# Patient Record
Sex: FEMALE | Race: WHITE | Employment: UNEMPLOYED | URBAN - METROPOLITAN AREA
[De-identification: names, ages, dates, MRNs, and addresses within clinical notes are randomized per-mention and may not be internally consistent; named-entity substitution may affect disease eponyms.]

---

## 2017-03-21 ENCOUNTER — OFFICE VISIT (OUTPATIENT)
Dept: FAMILY MEDICINE CLINIC | Age: 77
End: 2017-03-21

## 2017-03-21 VITALS
WEIGHT: 168 LBS | SYSTOLIC BLOOD PRESSURE: 110 MMHG | HEIGHT: 63 IN | DIASTOLIC BLOOD PRESSURE: 68 MMHG | BODY MASS INDEX: 29.77 KG/M2

## 2017-03-21 DIAGNOSIS — I10 ESSENTIAL HYPERTENSION: Primary | ICD-10-CM

## 2017-03-21 LAB
A/G RATIO: 1.7 (ref 1.1–2.2)
ALBUMIN SERPL-MCNC: 4.4 G/DL (ref 3.4–5)
ALP BLD-CCNC: 77 U/L (ref 40–129)
ALT SERPL-CCNC: 14 U/L (ref 10–40)
ANION GAP SERPL CALCULATED.3IONS-SCNC: 15 MMOL/L (ref 3–16)
AST SERPL-CCNC: 22 U/L (ref 15–37)
BILIRUB SERPL-MCNC: 0.5 MG/DL (ref 0–1)
BUN BLDV-MCNC: 18 MG/DL (ref 7–20)
CALCIUM SERPL-MCNC: 9.6 MG/DL (ref 8.3–10.6)
CHLORIDE BLD-SCNC: 102 MMOL/L (ref 99–110)
CHOLESTEROL, TOTAL: 232 MG/DL (ref 0–199)
CO2: 27 MMOL/L (ref 21–32)
CREAT SERPL-MCNC: 0.9 MG/DL (ref 0.6–1.2)
GFR AFRICAN AMERICAN: >60
GFR NON-AFRICAN AMERICAN: >60
GLOBULIN: 2.6 G/DL
GLUCOSE BLD-MCNC: 87 MG/DL (ref 70–99)
HDLC SERPL-MCNC: 92 MG/DL (ref 40–60)
LDL CHOLESTEROL CALCULATED: 119 MG/DL
POTASSIUM SERPL-SCNC: 4.1 MMOL/L (ref 3.5–5.1)
SODIUM BLD-SCNC: 144 MMOL/L (ref 136–145)
TOTAL PROTEIN: 7 G/DL (ref 6.4–8.2)
TRIGL SERPL-MCNC: 105 MG/DL (ref 0–150)
VLDLC SERPL CALC-MCNC: 21 MG/DL

## 2017-03-21 PROCEDURE — 99213 OFFICE O/P EST LOW 20 MIN: CPT | Performed by: FAMILY MEDICINE

## 2017-03-21 PROCEDURE — 1090F PRES/ABSN URINE INCON ASSESS: CPT | Performed by: FAMILY MEDICINE

## 2017-03-21 PROCEDURE — G8399 PT W/DXA RESULTS DOCUMENT: HCPCS | Performed by: FAMILY MEDICINE

## 2017-03-21 PROCEDURE — 36415 COLL VENOUS BLD VENIPUNCTURE: CPT | Performed by: FAMILY MEDICINE

## 2017-03-21 PROCEDURE — G8484 FLU IMMUNIZE NO ADMIN: HCPCS | Performed by: FAMILY MEDICINE

## 2017-03-21 PROCEDURE — 1036F TOBACCO NON-USER: CPT | Performed by: FAMILY MEDICINE

## 2017-03-21 PROCEDURE — 3014F SCREEN MAMMO DOC REV: CPT | Performed by: FAMILY MEDICINE

## 2017-03-21 PROCEDURE — 1123F ACP DISCUSS/DSCN MKR DOCD: CPT | Performed by: FAMILY MEDICINE

## 2017-03-21 PROCEDURE — 4040F PNEUMOC VAC/ADMIN/RCVD: CPT | Performed by: FAMILY MEDICINE

## 2017-03-21 PROCEDURE — G8420 CALC BMI NORM PARAMETERS: HCPCS | Performed by: FAMILY MEDICINE

## 2017-03-21 PROCEDURE — G8427 DOCREV CUR MEDS BY ELIG CLIN: HCPCS | Performed by: FAMILY MEDICINE

## 2017-03-21 PROCEDURE — 3017F COLORECTAL CA SCREEN DOC REV: CPT | Performed by: FAMILY MEDICINE

## 2017-06-12 DIAGNOSIS — I10 ESSENTIAL HYPERTENSION: ICD-10-CM

## 2017-06-12 RX ORDER — TRIAMTERENE AND HYDROCHLOROTHIAZIDE 37.5; 25 MG/1; MG/1
TABLET ORAL
Qty: 90 TABLET | Refills: 0 | Status: SHIPPED | OUTPATIENT
Start: 2017-06-12 | End: 2017-09-20 | Stop reason: SDUPTHER

## 2017-08-16 DIAGNOSIS — Z96.642 HISTORY OF TOTAL LEFT HIP REPLACEMENT: Primary | ICD-10-CM

## 2017-08-16 PROCEDURE — 73502 X-RAY EXAM HIP UNI 2-3 VIEWS: CPT | Performed by: ORTHOPAEDIC SURGERY

## 2017-09-20 ENCOUNTER — OFFICE VISIT (OUTPATIENT)
Dept: FAMILY MEDICINE CLINIC | Age: 77
End: 2017-09-20

## 2017-09-20 VITALS
HEIGHT: 63 IN | BODY MASS INDEX: 30.12 KG/M2 | DIASTOLIC BLOOD PRESSURE: 80 MMHG | WEIGHT: 170 LBS | SYSTOLIC BLOOD PRESSURE: 120 MMHG

## 2017-09-20 DIAGNOSIS — R53.83 FATIGUE, UNSPECIFIED TYPE: ICD-10-CM

## 2017-09-20 DIAGNOSIS — I10 ESSENTIAL HYPERTENSION: Primary | ICD-10-CM

## 2017-09-20 PROCEDURE — G8427 DOCREV CUR MEDS BY ELIG CLIN: HCPCS | Performed by: FAMILY MEDICINE

## 2017-09-20 PROCEDURE — 36415 COLL VENOUS BLD VENIPUNCTURE: CPT | Performed by: FAMILY MEDICINE

## 2017-09-20 PROCEDURE — 3017F COLORECTAL CA SCREEN DOC REV: CPT | Performed by: FAMILY MEDICINE

## 2017-09-20 PROCEDURE — 99214 OFFICE O/P EST MOD 30 MIN: CPT | Performed by: FAMILY MEDICINE

## 2017-09-20 PROCEDURE — 4040F PNEUMOC VAC/ADMIN/RCVD: CPT | Performed by: FAMILY MEDICINE

## 2017-09-20 PROCEDURE — G8399 PT W/DXA RESULTS DOCUMENT: HCPCS | Performed by: FAMILY MEDICINE

## 2017-09-20 PROCEDURE — 1036F TOBACCO NON-USER: CPT | Performed by: FAMILY MEDICINE

## 2017-09-20 PROCEDURE — 1090F PRES/ABSN URINE INCON ASSESS: CPT | Performed by: FAMILY MEDICINE

## 2017-09-20 PROCEDURE — G8417 CALC BMI ABV UP PARAM F/U: HCPCS | Performed by: FAMILY MEDICINE

## 2017-09-20 PROCEDURE — 1123F ACP DISCUSS/DSCN MKR DOCD: CPT | Performed by: FAMILY MEDICINE

## 2017-09-20 RX ORDER — TRIAMTERENE AND HYDROCHLOROTHIAZIDE 37.5; 25 MG/1; MG/1
TABLET ORAL
Qty: 90 TABLET | Refills: 1 | Status: SHIPPED | OUTPATIENT
Start: 2017-09-20 | End: 2018-04-03 | Stop reason: SDUPTHER

## 2017-09-20 ASSESSMENT — PATIENT HEALTH QUESTIONNAIRE - PHQ9
2. FEELING DOWN, DEPRESSED OR HOPELESS: 0
SUM OF ALL RESPONSES TO PHQ9 QUESTIONS 1 & 2: 0
SUM OF ALL RESPONSES TO PHQ QUESTIONS 1-9: 0
1. LITTLE INTEREST OR PLEASURE IN DOING THINGS: 0

## 2017-09-21 LAB
A/G RATIO: 1.5 (ref 1.1–2.2)
ALBUMIN SERPL-MCNC: 4.4 G/DL (ref 3.4–5)
ALP BLD-CCNC: 74 U/L (ref 40–129)
ALT SERPL-CCNC: 18 U/L (ref 10–40)
ANION GAP SERPL CALCULATED.3IONS-SCNC: 15 MMOL/L (ref 3–16)
AST SERPL-CCNC: 27 U/L (ref 15–37)
BASOPHILS ABSOLUTE: 0 K/UL (ref 0–0.2)
BASOPHILS RELATIVE PERCENT: 0.6 %
BILIRUB SERPL-MCNC: 0.4 MG/DL (ref 0–1)
BUN BLDV-MCNC: 22 MG/DL (ref 7–20)
CALCIUM SERPL-MCNC: 9.9 MG/DL (ref 8.3–10.6)
CHLORIDE BLD-SCNC: 104 MMOL/L (ref 99–110)
CO2: 26 MMOL/L (ref 21–32)
CREAT SERPL-MCNC: 1.2 MG/DL (ref 0.6–1.2)
EOSINOPHILS ABSOLUTE: 0.1 K/UL (ref 0–0.6)
EOSINOPHILS RELATIVE PERCENT: 1.8 %
GFR AFRICAN AMERICAN: 53
GFR NON-AFRICAN AMERICAN: 44
GLOBULIN: 2.9 G/DL
GLUCOSE BLD-MCNC: 89 MG/DL (ref 70–99)
HCT VFR BLD CALC: 43 % (ref 36–48)
HEMOGLOBIN: 14.4 G/DL (ref 12–16)
LYMPHOCYTES ABSOLUTE: 1.8 K/UL (ref 1–5.1)
LYMPHOCYTES RELATIVE PERCENT: 28.8 %
MCH RBC QN AUTO: 30.5 PG (ref 26–34)
MCHC RBC AUTO-ENTMCNC: 33.4 G/DL (ref 31–36)
MCV RBC AUTO: 91.3 FL (ref 80–100)
MONOCYTES ABSOLUTE: 0.8 K/UL (ref 0–1.3)
MONOCYTES RELATIVE PERCENT: 12.4 %
NEUTROPHILS ABSOLUTE: 3.6 K/UL (ref 1.7–7.7)
NEUTROPHILS RELATIVE PERCENT: 56.4 %
PDW BLD-RTO: 13.3 % (ref 12.4–15.4)
PLATELET # BLD: 144 K/UL (ref 135–450)
PMV BLD AUTO: 9.2 FL (ref 5–10.5)
POTASSIUM SERPL-SCNC: 4 MMOL/L (ref 3.5–5.1)
RBC # BLD: 4.71 M/UL (ref 4–5.2)
SODIUM BLD-SCNC: 145 MMOL/L (ref 136–145)
TOTAL PROTEIN: 7.3 G/DL (ref 6.4–8.2)
TSH SERPL DL<=0.05 MIU/L-ACNC: 2.99 UIU/ML (ref 0.27–4.2)
WBC # BLD: 6.3 K/UL (ref 4–11)

## 2018-04-03 ENCOUNTER — OFFICE VISIT (OUTPATIENT)
Dept: FAMILY MEDICINE CLINIC | Age: 78
End: 2018-04-03

## 2018-04-03 VITALS
OXYGEN SATURATION: 96 % | WEIGHT: 171.2 LBS | BODY MASS INDEX: 30.33 KG/M2 | HEIGHT: 63 IN | SYSTOLIC BLOOD PRESSURE: 110 MMHG | DIASTOLIC BLOOD PRESSURE: 70 MMHG | HEART RATE: 78 BPM

## 2018-04-03 DIAGNOSIS — K29.70 GASTRITIS, PRESENCE OF BLEEDING UNSPECIFIED, UNSPECIFIED CHRONICITY, UNSPECIFIED GASTRITIS TYPE: ICD-10-CM

## 2018-04-03 DIAGNOSIS — I10 ESSENTIAL HYPERTENSION: Primary | ICD-10-CM

## 2018-04-03 DIAGNOSIS — R25.1 TREMOR: ICD-10-CM

## 2018-04-03 LAB
A/G RATIO: 1.6 (ref 1.1–2.2)
ALBUMIN SERPL-MCNC: 4.4 G/DL (ref 3.4–5)
ALP BLD-CCNC: 72 U/L (ref 40–129)
ALT SERPL-CCNC: 16 U/L (ref 10–40)
ANION GAP SERPL CALCULATED.3IONS-SCNC: 16 MMOL/L (ref 3–16)
AST SERPL-CCNC: 24 U/L (ref 15–37)
BASOPHILS ABSOLUTE: 0 K/UL (ref 0–0.2)
BASOPHILS RELATIVE PERCENT: 0.8 %
BILIRUB SERPL-MCNC: 0.5 MG/DL (ref 0–1)
BUN BLDV-MCNC: 24 MG/DL (ref 7–20)
CALCIUM SERPL-MCNC: 9.5 MG/DL (ref 8.3–10.6)
CHLORIDE BLD-SCNC: 102 MMOL/L (ref 99–110)
CHOLESTEROL, TOTAL: 231 MG/DL (ref 0–199)
CO2: 27 MMOL/L (ref 21–32)
CREAT SERPL-MCNC: 1 MG/DL (ref 0.6–1.2)
EOSINOPHILS ABSOLUTE: 0.2 K/UL (ref 0–0.6)
EOSINOPHILS RELATIVE PERCENT: 4.2 %
FOLATE: >20 NG/ML (ref 4.78–24.2)
GFR AFRICAN AMERICAN: >60
GFR NON-AFRICAN AMERICAN: 54
GLOBULIN: 2.7 G/DL
GLUCOSE BLD-MCNC: 86 MG/DL (ref 70–99)
HCT VFR BLD CALC: 40.9 % (ref 36–48)
HDLC SERPL-MCNC: 93 MG/DL (ref 40–60)
HEMOGLOBIN: 14.2 G/DL (ref 12–16)
LDL CHOLESTEROL CALCULATED: 116 MG/DL
LYMPHOCYTES ABSOLUTE: 1.5 K/UL (ref 1–5.1)
LYMPHOCYTES RELATIVE PERCENT: 32.6 %
MCH RBC QN AUTO: 31 PG (ref 26–34)
MCHC RBC AUTO-ENTMCNC: 34.7 G/DL (ref 31–36)
MCV RBC AUTO: 89.3 FL (ref 80–100)
MONOCYTES ABSOLUTE: 0.5 K/UL (ref 0–1.3)
MONOCYTES RELATIVE PERCENT: 10.1 %
NEUTROPHILS ABSOLUTE: 2.4 K/UL (ref 1.7–7.7)
NEUTROPHILS RELATIVE PERCENT: 52.3 %
PDW BLD-RTO: 13.1 % (ref 12.4–15.4)
PLATELET # BLD: 162 K/UL (ref 135–450)
PMV BLD AUTO: 9.2 FL (ref 5–10.5)
POTASSIUM SERPL-SCNC: 4.4 MMOL/L (ref 3.5–5.1)
RBC # BLD: 4.58 M/UL (ref 4–5.2)
SODIUM BLD-SCNC: 145 MMOL/L (ref 136–145)
TOTAL PROTEIN: 7.1 G/DL (ref 6.4–8.2)
TRIGL SERPL-MCNC: 108 MG/DL (ref 0–150)
VITAMIN B-12: 378 PG/ML (ref 211–911)
VLDLC SERPL CALC-MCNC: 22 MG/DL
WBC # BLD: 4.6 K/UL (ref 4–11)

## 2018-04-03 PROCEDURE — G8417 CALC BMI ABV UP PARAM F/U: HCPCS | Performed by: FAMILY MEDICINE

## 2018-04-03 PROCEDURE — 1036F TOBACCO NON-USER: CPT | Performed by: FAMILY MEDICINE

## 2018-04-03 PROCEDURE — 36415 COLL VENOUS BLD VENIPUNCTURE: CPT | Performed by: FAMILY MEDICINE

## 2018-04-03 PROCEDURE — 1090F PRES/ABSN URINE INCON ASSESS: CPT | Performed by: FAMILY MEDICINE

## 2018-04-03 PROCEDURE — 3017F COLORECTAL CA SCREEN DOC REV: CPT | Performed by: FAMILY MEDICINE

## 2018-04-03 PROCEDURE — 1123F ACP DISCUSS/DSCN MKR DOCD: CPT | Performed by: FAMILY MEDICINE

## 2018-04-03 PROCEDURE — 4040F PNEUMOC VAC/ADMIN/RCVD: CPT | Performed by: FAMILY MEDICINE

## 2018-04-03 PROCEDURE — G8427 DOCREV CUR MEDS BY ELIG CLIN: HCPCS | Performed by: FAMILY MEDICINE

## 2018-04-03 PROCEDURE — 99214 OFFICE O/P EST MOD 30 MIN: CPT | Performed by: FAMILY MEDICINE

## 2018-04-03 PROCEDURE — G8399 PT W/DXA RESULTS DOCUMENT: HCPCS | Performed by: FAMILY MEDICINE

## 2018-04-03 RX ORDER — TRIAMTERENE AND HYDROCHLOROTHIAZIDE 37.5; 25 MG/1; MG/1
TABLET ORAL
Qty: 90 TABLET | Refills: 1 | Status: SHIPPED | OUTPATIENT
Start: 2018-04-03 | End: 2018-10-04 | Stop reason: SDUPTHER

## 2018-08-21 DIAGNOSIS — M25.552 LEFT HIP PAIN: Primary | ICD-10-CM

## 2018-09-24 ENCOUNTER — TELEPHONE (OUTPATIENT)
Dept: FAMILY MEDICINE CLINIC | Age: 78
End: 2018-09-24

## 2018-10-04 DIAGNOSIS — I10 ESSENTIAL HYPERTENSION: ICD-10-CM

## 2018-10-08 RX ORDER — TRIAMTERENE AND HYDROCHLOROTHIAZIDE 37.5; 25 MG/1; MG/1
TABLET ORAL
Qty: 90 TABLET | Refills: 0 | Status: SHIPPED | OUTPATIENT
Start: 2018-10-08 | End: 2018-11-08 | Stop reason: SDUPTHER

## 2018-10-10 ENCOUNTER — OFFICE VISIT (OUTPATIENT)
Dept: FAMILY MEDICINE CLINIC | Age: 78
End: 2018-10-10
Payer: MEDICARE

## 2018-10-10 VITALS
TEMPERATURE: 97.7 F | DIASTOLIC BLOOD PRESSURE: 80 MMHG | HEART RATE: 88 BPM | SYSTOLIC BLOOD PRESSURE: 120 MMHG | OXYGEN SATURATION: 95 % | HEIGHT: 64 IN | BODY MASS INDEX: 29.85 KG/M2

## 2018-10-10 DIAGNOSIS — S39.012A BACK STRAIN, INITIAL ENCOUNTER: ICD-10-CM

## 2018-10-10 DIAGNOSIS — I10 ESSENTIAL HYPERTENSION: Primary | ICD-10-CM

## 2018-10-10 DIAGNOSIS — K21.9 GASTROESOPHAGEAL REFLUX DISEASE WITHOUT ESOPHAGITIS: ICD-10-CM

## 2018-10-10 PROCEDURE — 1036F TOBACCO NON-USER: CPT | Performed by: PHYSICIAN ASSISTANT

## 2018-10-10 PROCEDURE — 1101F PT FALLS ASSESS-DOCD LE1/YR: CPT | Performed by: PHYSICIAN ASSISTANT

## 2018-10-10 PROCEDURE — G8399 PT W/DXA RESULTS DOCUMENT: HCPCS | Performed by: PHYSICIAN ASSISTANT

## 2018-10-10 PROCEDURE — 4040F PNEUMOC VAC/ADMIN/RCVD: CPT | Performed by: PHYSICIAN ASSISTANT

## 2018-10-10 PROCEDURE — 1090F PRES/ABSN URINE INCON ASSESS: CPT | Performed by: PHYSICIAN ASSISTANT

## 2018-10-10 PROCEDURE — G8427 DOCREV CUR MEDS BY ELIG CLIN: HCPCS | Performed by: PHYSICIAN ASSISTANT

## 2018-10-10 PROCEDURE — G8417 CALC BMI ABV UP PARAM F/U: HCPCS | Performed by: PHYSICIAN ASSISTANT

## 2018-10-10 PROCEDURE — 99214 OFFICE O/P EST MOD 30 MIN: CPT | Performed by: PHYSICIAN ASSISTANT

## 2018-10-10 PROCEDURE — G8484 FLU IMMUNIZE NO ADMIN: HCPCS | Performed by: PHYSICIAN ASSISTANT

## 2018-10-10 PROCEDURE — 1123F ACP DISCUSS/DSCN MKR DOCD: CPT | Performed by: PHYSICIAN ASSISTANT

## 2018-10-10 RX ORDER — LISINOPRIL 20 MG/1
20 TABLET ORAL DAILY
Qty: 30 TABLET | Refills: 3 | Status: SHIPPED | OUTPATIENT
Start: 2018-10-10 | End: 2018-10-19

## 2018-10-10 NOTE — PROGRESS NOTES
Brissa Wilcox 29 COMPLAINT  Chief Complaint   Patient presents with    Hypertension    Other     mid back pain x 2 months on and off no injury    Jaw Pain     aches on and off x 2 months       HISTORY OF PRESENT  ILLNESS  Hernesto Dillon is a 70 y.o.  female   Here for 6 mo follow up:    HTN: stable. Highest 135-125/ 92-80 mmHG. The triamterene continued to cause stomach burning despite with and without food and taking Prilosec as well. Wants to try another med. Has a good diet and exercises. Does not drink cola. Rarely eats fried foods. Also c/o thoracic back pains for a few months. Denies bowel or bladder changes or incontinence. Denies extremity paresthesias or weakness. Assoc with left shoulder joint pain. Able to move all joints. Is worse with stress. Exerercises regularly. Two months ago c/o left jaw pains. Clenches teeth and has a mouth piece but not using it lately. ROS    Remaining are reviewed and otherwise negative for other constitutional, neurologic, EENT, cardiac, pulmonary, GI, , musculoskeletal or extremity complaints. PAST MEDICAL/SURGICAL, SOCIAL, &  FAMILY HISTORY:  Reviewed and updated accordingly. ALLERGIES :   Shellfish allergy    MEDICATIONS:  Current Outpatient Prescriptions   Medication Sig Dispense Refill    lisinopril (PRINIVIL;ZESTRIL) 20 MG tablet Take 1 tablet by mouth daily 30 tablet 3    triamterene-hydrochlorothiazide (MAXZIDE-25) 37.5-25 MG per tablet TAKE ONE TABLET BY MOUTH DAILY 90 tablet 0     No current facility-administered medications for this visit. PHYSICAL EXAM     Vitals:    10/10/18 1317   BP: 120/80   Site: Right Upper Arm   Position: Sitting   Cuff Size: Medium Adult   Pulse: 88   Temp: 97.7 °F (36.5 °C)   TempSrc: Temporal   SpO2: 95%   Height: 5' 3.5\" (1.613 m)     APPEARANCE: Pleasant, friendly, well-nourished. No acute distress. HEENT: Head: Normocephalic, Jaw clicking. Eyes: EOMI,PERRLA.

## 2018-10-21 ENCOUNTER — TELEPHONE (OUTPATIENT)
Dept: OTHER | Age: 78
End: 2018-10-21

## 2018-11-08 ENCOUNTER — OFFICE VISIT (OUTPATIENT)
Dept: FAMILY MEDICINE CLINIC | Age: 78
End: 2018-11-08
Payer: MEDICARE

## 2018-11-08 VITALS
HEIGHT: 64 IN | SYSTOLIC BLOOD PRESSURE: 128 MMHG | OXYGEN SATURATION: 98 % | BODY MASS INDEX: 29.71 KG/M2 | DIASTOLIC BLOOD PRESSURE: 70 MMHG | HEART RATE: 70 BPM | WEIGHT: 174 LBS | RESPIRATION RATE: 18 BRPM

## 2018-11-08 DIAGNOSIS — I10 ESSENTIAL HYPERTENSION: ICD-10-CM

## 2018-11-08 PROCEDURE — 4040F PNEUMOC VAC/ADMIN/RCVD: CPT | Performed by: PHYSICIAN ASSISTANT

## 2018-11-08 PROCEDURE — 1036F TOBACCO NON-USER: CPT | Performed by: PHYSICIAN ASSISTANT

## 2018-11-08 PROCEDURE — G8427 DOCREV CUR MEDS BY ELIG CLIN: HCPCS | Performed by: PHYSICIAN ASSISTANT

## 2018-11-08 PROCEDURE — G8399 PT W/DXA RESULTS DOCUMENT: HCPCS | Performed by: PHYSICIAN ASSISTANT

## 2018-11-08 PROCEDURE — 1090F PRES/ABSN URINE INCON ASSESS: CPT | Performed by: PHYSICIAN ASSISTANT

## 2018-11-08 PROCEDURE — G8417 CALC BMI ABV UP PARAM F/U: HCPCS | Performed by: PHYSICIAN ASSISTANT

## 2018-11-08 PROCEDURE — 99213 OFFICE O/P EST LOW 20 MIN: CPT | Performed by: PHYSICIAN ASSISTANT

## 2018-11-08 PROCEDURE — 1101F PT FALLS ASSESS-DOCD LE1/YR: CPT | Performed by: PHYSICIAN ASSISTANT

## 2018-11-08 PROCEDURE — 1123F ACP DISCUSS/DSCN MKR DOCD: CPT | Performed by: PHYSICIAN ASSISTANT

## 2018-11-08 PROCEDURE — G8484 FLU IMMUNIZE NO ADMIN: HCPCS | Performed by: PHYSICIAN ASSISTANT

## 2018-11-08 PROCEDURE — G8510 SCR DEP NEG, NO PLAN REQD: HCPCS | Performed by: PHYSICIAN ASSISTANT

## 2018-11-08 RX ORDER — TRIAMTERENE AND HYDROCHLOROTHIAZIDE 37.5; 25 MG/1; MG/1
TABLET ORAL
Qty: 90 TABLET | Refills: 2 | Status: SHIPPED | OUTPATIENT
Start: 2018-11-08 | End: 2019-04-17 | Stop reason: SDUPTHER

## 2018-11-08 ASSESSMENT — PATIENT HEALTH QUESTIONNAIRE - PHQ9
SUM OF ALL RESPONSES TO PHQ QUESTIONS 1-9: 0
1. LITTLE INTEREST OR PLEASURE IN DOING THINGS: 0
SUM OF ALL RESPONSES TO PHQ9 QUESTIONS 1 & 2: 0
SUM OF ALL RESPONSES TO PHQ QUESTIONS 1-9: 0
2. FEELING DOWN, DEPRESSED OR HOPELESS: 0

## 2018-11-08 NOTE — PROGRESS NOTES
sensory exam.  SKIN: Warm, dry, normal turgor. Cap refill <3secs. No rashes, petechiae, purpura. ADDITIONAL STUDIES     Pertinent laboratory results and/or imaging reviewed. No orders of the defined types were placed in this encounter. IMPRESSION/ PLAN      1. Essential hypertension    -Stable back on HCTZ-triamterene.   -Labwork UTD  -Continue good diet and exercise. 2. HEALTH MTNCE:  Does not wish to have fluvax.        Orders Placed This Encounter   Medications    triamterene-hydrochlorothiazide (MAXZIDE-25) 37.5-25 MG per tablet     Sig: TAKE ONE TABLET BY MOUTH DAILY     Dispense:  90 tablet     Refill:  2

## 2019-04-17 ENCOUNTER — OFFICE VISIT (OUTPATIENT)
Dept: FAMILY MEDICINE CLINIC | Age: 79
End: 2019-04-17
Payer: MEDICARE

## 2019-04-17 VITALS
WEIGHT: 173.8 LBS | HEIGHT: 63 IN | SYSTOLIC BLOOD PRESSURE: 120 MMHG | BODY MASS INDEX: 30.79 KG/M2 | DIASTOLIC BLOOD PRESSURE: 70 MMHG | OXYGEN SATURATION: 97 % | HEART RATE: 85 BPM

## 2019-04-17 DIAGNOSIS — G89.29 CHRONIC PAIN OF RIGHT KNEE: ICD-10-CM

## 2019-04-17 DIAGNOSIS — I10 ESSENTIAL HYPERTENSION: Primary | ICD-10-CM

## 2019-04-17 DIAGNOSIS — M25.561 CHRONIC PAIN OF RIGHT KNEE: ICD-10-CM

## 2019-04-17 DIAGNOSIS — K21.9 GASTROESOPHAGEAL REFLUX DISEASE WITHOUT ESOPHAGITIS: ICD-10-CM

## 2019-04-17 LAB
A/G RATIO: 1.5 (ref 1.1–2.2)
ALBUMIN SERPL-MCNC: 4.5 G/DL (ref 3.4–5)
ALP BLD-CCNC: 80 U/L (ref 40–129)
ALT SERPL-CCNC: 18 U/L (ref 10–40)
ANION GAP SERPL CALCULATED.3IONS-SCNC: 14 MMOL/L (ref 3–16)
AST SERPL-CCNC: 24 U/L (ref 15–37)
BILIRUB SERPL-MCNC: 0.4 MG/DL (ref 0–1)
BUN BLDV-MCNC: 23 MG/DL (ref 7–20)
CALCIUM SERPL-MCNC: 9.8 MG/DL (ref 8.3–10.6)
CHLORIDE BLD-SCNC: 105 MMOL/L (ref 99–110)
CHOLESTEROL, TOTAL: 235 MG/DL (ref 0–199)
CO2: 27 MMOL/L (ref 21–32)
CREAT SERPL-MCNC: 1 MG/DL (ref 0.6–1.2)
GFR AFRICAN AMERICAN: >60
GFR NON-AFRICAN AMERICAN: 54
GLOBULIN: 3 G/DL
GLUCOSE BLD-MCNC: 96 MG/DL (ref 70–99)
HDLC SERPL-MCNC: 102 MG/DL (ref 40–60)
LDL CHOLESTEROL CALCULATED: 118 MG/DL
POTASSIUM SERPL-SCNC: 4.2 MMOL/L (ref 3.5–5.1)
SODIUM BLD-SCNC: 146 MMOL/L (ref 136–145)
TOTAL PROTEIN: 7.5 G/DL (ref 6.4–8.2)
TRIGL SERPL-MCNC: 73 MG/DL (ref 0–150)
VLDLC SERPL CALC-MCNC: 15 MG/DL

## 2019-04-17 PROCEDURE — 99214 OFFICE O/P EST MOD 30 MIN: CPT | Performed by: FAMILY MEDICINE

## 2019-04-17 PROCEDURE — 1123F ACP DISCUSS/DSCN MKR DOCD: CPT | Performed by: FAMILY MEDICINE

## 2019-04-17 PROCEDURE — 36415 COLL VENOUS BLD VENIPUNCTURE: CPT | Performed by: FAMILY MEDICINE

## 2019-04-17 PROCEDURE — G8427 DOCREV CUR MEDS BY ELIG CLIN: HCPCS | Performed by: FAMILY MEDICINE

## 2019-04-17 PROCEDURE — 4040F PNEUMOC VAC/ADMIN/RCVD: CPT | Performed by: FAMILY MEDICINE

## 2019-04-17 PROCEDURE — G8417 CALC BMI ABV UP PARAM F/U: HCPCS | Performed by: FAMILY MEDICINE

## 2019-04-17 PROCEDURE — 1090F PRES/ABSN URINE INCON ASSESS: CPT | Performed by: FAMILY MEDICINE

## 2019-04-17 PROCEDURE — 1036F TOBACCO NON-USER: CPT | Performed by: FAMILY MEDICINE

## 2019-04-17 PROCEDURE — G8399 PT W/DXA RESULTS DOCUMENT: HCPCS | Performed by: FAMILY MEDICINE

## 2019-04-17 RX ORDER — TRIAMTERENE AND HYDROCHLOROTHIAZIDE 37.5; 25 MG/1; MG/1
TABLET ORAL
Qty: 90 TABLET | Refills: 2 | Status: SHIPPED | OUTPATIENT
Start: 2019-04-17 | End: 2019-10-22 | Stop reason: SDUPTHER

## 2019-04-17 ASSESSMENT — PATIENT HEALTH QUESTIONNAIRE - PHQ9
SUM OF ALL RESPONSES TO PHQ9 QUESTIONS 1 & 2: 0
2. FEELING DOWN, DEPRESSED OR HOPELESS: 0
SUM OF ALL RESPONSES TO PHQ QUESTIONS 1-9: 0
1. LITTLE INTEREST OR PLEASURE IN DOING THINGS: 0
SUM OF ALL RESPONSES TO PHQ QUESTIONS 1-9: 0

## 2019-04-17 NOTE — PROGRESS NOTES
SUBJECTIVE:  Vasyl Pollack is a 70 y.o. female who presents for evaluation of hypertension and GERD. She indicates that she is feeling well and denies any symptoms referable to her elevated blood pressure. Specifically denies chest pain, palpitations, dyspnea, orthopnea, PND or peripheral edema or neuro sx. No anorexia, or leg cramps noted. Current medication regimen is as listed below. She denies any side effects of medication, and has been taking it regularly. Lab Results   Component Value Date    LDLCALC 116 (H) 04/03/2018       Pt states GERD still causes sx despite ppi. She had EGD 2017. R knee pain w/ steps. Walks 2 mi daily, no pain w/ this. No fall, occasioanlly takes advil. No locking or giving way    Current Outpatient Medications   Medication Sig Dispense Refill    triamterene-hydrochlorothiazide (MAXZIDE-25) 37.5-25 MG per tablet TAKE ONE TABLET BY MOUTH DAILY 90 tablet 2     No current facility-administered medications for this visit. Allergies   Allergen Reactions    Shellfish Allergy Anaphylaxis     Problem with only shrimp   Okay with topical iodine       Social History     Tobacco Use    Smoking status: Never Smoker    Smokeless tobacco: Never Used   Substance Use Topics    Alcohol use: Yes     Alcohol/week: 0.0 oz     Comment: rare       OBJECTIVE:   /70   Pulse 85   Ht 5' 3\" (1.6 m)   Wt 173 lb 12.8 oz (78.8 kg)   SpO2 97%   BMI 30.79 kg/m²   NAD  Neck no bruit or JVD  S1 and S2 normal, no murmurs, clicks, gallops or rubs. Regular rate and rhythm. Chest is clear; no wheezes or rales. No edema or JVD. Neuro alert, no CN or motor deficits  Psych nl mood, thought and judgement. R knee no effusion, no crepitus. Valgus deformity    ASSESSMENT:   Diagnosis Orders   1. Essential hypertension  triamterene-hydrochlorothiazide, well controlled (MAXZIDE-25) 37.5-25 MG per tablet    Lipid Panel    Comprehensive Metabolic Panel   2.  Gastroesophageal reflux disease without esophagitis  Cont ppi, return to [address] if sx persist   3. Chronic pain of right knee  Knee sleeve, ice, cont walking, try tylenol, if not helpful try advil at lowest dose        Plan:  1)  Medication: continue current medication regimen unchanged  2)  Recheck in 6 months, sooner should new symptoms or problems arise. 3) LLR     Radha received counseling on the following healthy behaviors: exercise and medication adherence         Discussed use, benefit, and side effects of prescribed medications. Barriers to medication compliance addressed. All patient questions answered. Pt voiced understanding.

## 2019-06-30 RX ORDER — METHYLPREDNISOLONE 4 MG/1
TABLET ORAL
Qty: 1 KIT | Refills: 0 | Status: SHIPPED | OUTPATIENT
Start: 2019-06-30 | End: 2019-07-06

## 2019-06-30 RX ORDER — AMOXICILLIN 500 MG/1
500 CAPSULE ORAL 3 TIMES DAILY
Qty: 30 CAPSULE | Refills: 0 | Status: SHIPPED | OUTPATIENT
Start: 2019-06-30 | End: 2019-07-10

## 2019-09-23 ENCOUNTER — APPOINTMENT (OUTPATIENT)
Dept: CT IMAGING | Age: 79
End: 2019-09-23
Payer: MEDICARE

## 2019-09-23 ENCOUNTER — HOSPITAL ENCOUNTER (OUTPATIENT)
Age: 79
Setting detail: OBSERVATION
Discharge: HOME | End: 2019-09-24
Attending: EMERGENCY MEDICINE | Admitting: INTERNAL MEDICINE
Payer: MEDICARE

## 2019-09-23 ENCOUNTER — APPOINTMENT (OUTPATIENT)
Dept: GENERAL RADIOLOGY | Age: 79
End: 2019-09-23
Payer: MEDICARE

## 2019-09-23 DIAGNOSIS — R11.2 NAUSEA AND VOMITING, INTRACTABILITY OF VOMITING NOT SPECIFIED, UNSPECIFIED VOMITING TYPE: Primary | ICD-10-CM

## 2019-09-23 DIAGNOSIS — R42 VERTIGO: ICD-10-CM

## 2019-09-23 LAB
ANION GAP SERPL CALCULATED.3IONS-SCNC: 15 MMOL/L (ref 3–16)
BASOPHILS ABSOLUTE: 0.1 K/UL (ref 0–0.2)
BASOPHILS RELATIVE PERCENT: 0.7 %
BILIRUBIN URINE: NEGATIVE
BLOOD, URINE: NEGATIVE
BUN BLDV-MCNC: 22 MG/DL (ref 7–20)
CALCIUM SERPL-MCNC: 10.1 MG/DL (ref 8.3–10.6)
CHLORIDE BLD-SCNC: 102 MMOL/L (ref 99–110)
CLARITY: CLEAR
CO2: 26 MMOL/L (ref 21–32)
COLOR: YELLOW
CREAT SERPL-MCNC: 1.1 MG/DL (ref 0.6–1.2)
EOSINOPHILS ABSOLUTE: 0.2 K/UL (ref 0–0.6)
EOSINOPHILS RELATIVE PERCENT: 3 %
GFR AFRICAN AMERICAN: 58
GFR NON-AFRICAN AMERICAN: 48
GLUCOSE BLD-MCNC: 160 MG/DL (ref 70–99)
GLUCOSE URINE: NEGATIVE MG/DL
HCT VFR BLD CALC: 43.6 % (ref 36–48)
HEMOGLOBIN: 15 G/DL (ref 12–16)
KETONES, URINE: NEGATIVE MG/DL
LEUKOCYTE ESTERASE, URINE: NEGATIVE
LYMPHOCYTES ABSOLUTE: 3.1 K/UL (ref 1–5.1)
LYMPHOCYTES RELATIVE PERCENT: 40.7 %
MAGNESIUM: 1.9 MG/DL (ref 1.8–2.4)
MCH RBC QN AUTO: 31.1 PG (ref 26–34)
MCHC RBC AUTO-ENTMCNC: 34.5 G/DL (ref 31–36)
MCV RBC AUTO: 90.1 FL (ref 80–100)
MICROSCOPIC EXAMINATION: NORMAL
MONOCYTES ABSOLUTE: 0.8 K/UL (ref 0–1.3)
MONOCYTES RELATIVE PERCENT: 9.9 %
NEUTROPHILS ABSOLUTE: 3.5 K/UL (ref 1.7–7.7)
NEUTROPHILS RELATIVE PERCENT: 45.7 %
NITRITE, URINE: NEGATIVE
PDW BLD-RTO: 13.1 % (ref 12.4–15.4)
PH UA: 5 (ref 5–8)
PLATELET # BLD: 188 K/UL (ref 135–450)
PMV BLD AUTO: 8.4 FL (ref 5–10.5)
POTASSIUM REFLEX MAGNESIUM: 3.2 MMOL/L (ref 3.5–5.1)
PROTEIN UA: NEGATIVE MG/DL
RBC # BLD: 4.84 M/UL (ref 4–5.2)
SALICYLATE, SERUM: <0.3 MG/DL (ref 15–30)
SODIUM BLD-SCNC: 143 MMOL/L (ref 136–145)
SPECIFIC GRAVITY UA: 1.01 (ref 1–1.03)
TROPONIN: <0.01 NG/ML
URINE TYPE: NORMAL
UROBILINOGEN, URINE: 0.2 E.U./DL
WBC # BLD: 7.6 K/UL (ref 4–11)

## 2019-09-23 PROCEDURE — 94761 N-INVAS EAR/PLS OXIMETRY MLT: CPT

## 2019-09-23 PROCEDURE — 96375 TX/PRO/DX INJ NEW DRUG ADDON: CPT

## 2019-09-23 PROCEDURE — 84484 ASSAY OF TROPONIN QUANT: CPT

## 2019-09-23 PROCEDURE — 71046 X-RAY EXAM CHEST 2 VIEWS: CPT

## 2019-09-23 PROCEDURE — 83735 ASSAY OF MAGNESIUM: CPT

## 2019-09-23 PROCEDURE — 85025 COMPLETE CBC W/AUTO DIFF WBC: CPT

## 2019-09-23 PROCEDURE — 2580000003 HC RX 258: Performed by: NURSE PRACTITIONER

## 2019-09-23 PROCEDURE — 97165 OT EVAL LOW COMPLEX 30 MIN: CPT

## 2019-09-23 PROCEDURE — 97110 THERAPEUTIC EXERCISES: CPT

## 2019-09-23 PROCEDURE — 99285 EMERGENCY DEPT VISIT HI MDM: CPT

## 2019-09-23 PROCEDURE — 6370000000 HC RX 637 (ALT 250 FOR IP): Performed by: EMERGENCY MEDICINE

## 2019-09-23 PROCEDURE — G0480 DRUG TEST DEF 1-7 CLASSES: HCPCS

## 2019-09-23 PROCEDURE — 80048 BASIC METABOLIC PNL TOTAL CA: CPT

## 2019-09-23 PROCEDURE — 97161 PT EVAL LOW COMPLEX 20 MIN: CPT

## 2019-09-23 PROCEDURE — G0378 HOSPITAL OBSERVATION PER HR: HCPCS

## 2019-09-23 PROCEDURE — 2700000000 HC OXYGEN THERAPY PER DAY

## 2019-09-23 PROCEDURE — 2580000003 HC RX 258: Performed by: EMERGENCY MEDICINE

## 2019-09-23 PROCEDURE — 96361 HYDRATE IV INFUSION ADD-ON: CPT

## 2019-09-23 PROCEDURE — 93005 ELECTROCARDIOGRAM TRACING: CPT | Performed by: EMERGENCY MEDICINE

## 2019-09-23 PROCEDURE — 97535 SELF CARE MNGMENT TRAINING: CPT

## 2019-09-23 PROCEDURE — 97116 GAIT TRAINING THERAPY: CPT

## 2019-09-23 PROCEDURE — 96374 THER/PROPH/DIAG INJ IV PUSH: CPT

## 2019-09-23 PROCEDURE — 81003 URINALYSIS AUTO W/O SCOPE: CPT

## 2019-09-23 PROCEDURE — 70450 CT HEAD/BRAIN W/O DYE: CPT

## 2019-09-23 PROCEDURE — 6360000002 HC RX W HCPCS: Performed by: EMERGENCY MEDICINE

## 2019-09-23 RX ORDER — SODIUM CHLORIDE 0.9 % (FLUSH) 0.9 %
10 SYRINGE (ML) INJECTION PRN
Status: DISCONTINUED | OUTPATIENT
Start: 2019-09-23 | End: 2019-09-24 | Stop reason: HOSPADM

## 2019-09-23 RX ORDER — POTASSIUM CHLORIDE 20 MEQ/1
40 TABLET, EXTENDED RELEASE ORAL ONCE
Status: COMPLETED | OUTPATIENT
Start: 2019-09-23 | End: 2019-09-23

## 2019-09-23 RX ORDER — DIPHENHYDRAMINE HYDROCHLORIDE 50 MG/ML
25 INJECTION INTRAMUSCULAR; INTRAVENOUS ONCE
Status: COMPLETED | OUTPATIENT
Start: 2019-09-23 | End: 2019-09-23

## 2019-09-23 RX ORDER — MECLIZINE HCL 12.5 MG/1
12.5 TABLET ORAL 3 TIMES DAILY PRN
Status: DISCONTINUED | OUTPATIENT
Start: 2019-09-23 | End: 2019-09-24 | Stop reason: HOSPADM

## 2019-09-23 RX ORDER — ONDANSETRON 2 MG/ML
4 INJECTION INTRAMUSCULAR; INTRAVENOUS EVERY 6 HOURS PRN
Status: DISCONTINUED | OUTPATIENT
Start: 2019-09-23 | End: 2019-09-24 | Stop reason: HOSPADM

## 2019-09-23 RX ORDER — SODIUM CHLORIDE 9 MG/ML
INJECTION, SOLUTION INTRAVENOUS CONTINUOUS
Status: DISCONTINUED | OUTPATIENT
Start: 2019-09-23 | End: 2019-09-24 | Stop reason: HOSPADM

## 2019-09-23 RX ORDER — MECLIZINE HCL 12.5 MG/1
25 TABLET ORAL ONCE
Status: COMPLETED | OUTPATIENT
Start: 2019-09-23 | End: 2019-09-23

## 2019-09-23 RX ORDER — ONDANSETRON 2 MG/ML
4 INJECTION INTRAMUSCULAR; INTRAVENOUS ONCE
Status: COMPLETED | OUTPATIENT
Start: 2019-09-23 | End: 2019-09-23

## 2019-09-23 RX ORDER — 0.9 % SODIUM CHLORIDE 0.9 %
1000 INTRAVENOUS SOLUTION INTRAVENOUS ONCE
Status: COMPLETED | OUTPATIENT
Start: 2019-09-23 | End: 2019-09-23

## 2019-09-23 RX ORDER — SODIUM CHLORIDE 0.9 % (FLUSH) 0.9 %
10 SYRINGE (ML) INJECTION EVERY 12 HOURS SCHEDULED
Status: DISCONTINUED | OUTPATIENT
Start: 2019-09-23 | End: 2019-09-24 | Stop reason: HOSPADM

## 2019-09-23 RX ADMIN — MECLIZINE 25 MG: 12.5 TABLET ORAL at 06:51

## 2019-09-23 RX ADMIN — POTASSIUM CHLORIDE 40 MEQ: 20 TABLET, EXTENDED RELEASE ORAL at 09:26

## 2019-09-23 RX ADMIN — SODIUM CHLORIDE: 9 INJECTION, SOLUTION INTRAVENOUS at 13:29

## 2019-09-23 RX ADMIN — DIPHENHYDRAMINE HYDROCHLORIDE 25 MG: 50 INJECTION, SOLUTION INTRAMUSCULAR; INTRAVENOUS at 06:51

## 2019-09-23 RX ADMIN — SODIUM CHLORIDE 1000 ML: 9 INJECTION, SOLUTION INTRAVENOUS at 06:51

## 2019-09-23 RX ADMIN — ONDANSETRON 4 MG: 2 INJECTION INTRAMUSCULAR; INTRAVENOUS at 06:51

## 2019-09-23 ASSESSMENT — ENCOUNTER SYMPTOMS
SHORTNESS OF BREATH: 0
ABDOMINAL PAIN: 0
TROUBLE SWALLOWING: 0
WHEEZING: 0
STRIDOR: 0
COUGH: 0
VOMITING: 1
CHEST TIGHTNESS: 0
SORE THROAT: 0
DIARRHEA: 1
NAUSEA: 1
RHINORRHEA: 0

## 2019-09-23 ASSESSMENT — PAIN SCALES - GENERAL: PAINLEVEL_OUTOF10: 0

## 2019-09-23 NOTE — ED NOTES
[facility] Pt states her dizziness is \"better\" since taking meclizine. Pt denies chest pain, shortness of breath or pain at this time. Pending MD updates/disposition. Spouse at bedside. Both deny needs at present. Call light in reach.       Carmela Aguayo RN  09/23/19 [id number]

## 2019-09-23 NOTE — ED NOTES
Pt resting in bed at this time. Pt requesting to wait approximately 15 minutes before taking potassium pills.      Hector Daley RN  09/23/19 [id number]

## 2019-09-23 NOTE — ED NOTES
Bedside report to TRACY Correa, from C5. Pt to ready admit bed 535 via wheelchair. Spouse moving car from ED parking to main hospital and is aware of patient's room assignment.       Shruthi John RN  09/23/19 6940

## 2019-09-23 NOTE — ED PROVIDER NOTES
[address] [facility]  ED  eMERGENCYdEPARTMENT eNCOUnter      Pt Name: Uvaldo Huynh  MRN: [id number]  [place] 1940  Date of evaluation: 9/23/2019  Cody Silver MD    CHIEF COMPLAINT       Chief Complaint   Patient presents with    Nausea     N/V since Saturday.  Emesis    Dizziness     Since Saturday. Feels like room is spinning. HISTORY OF PRESENT ILLNESS   (Location/Symptom, Timing/Onset,Context/Setting, Quality, Duration, Modifying Factors, Severity)  Note limiting factors. Uvaldo Huynh is a 70 y.o. female who presents to the emergency department for dizziness, n/v/d. Patient reports she noticed a white noise to her right ear, started having dizziness described as \"the room is spinning\" and soon after started to have nausea, vomiting and diarrhea. Symptoms were improved and was able to sleep, and felt better on Sunday however when she woke up this morning had room spinning sensation and nausea vomiting started again. Denies numbness, tingling, weakness, blurry vision. HPI    Nursing Notes were reviewed. REVIEW OF SYSTEMS    (2-9 systems for level 4, 10 or more for level 5)     Review of Systems   Constitutional: Negative for activity change, appetite change, diaphoresis and fever. HENT: Negative for congestion, rhinorrhea, sore throat and trouble swallowing. Respiratory: Negative for cough, chest tightness, shortness of breath, wheezing and stridor. Cardiovascular: Negative for chest pain and palpitations. Gastrointestinal: Positive for diarrhea, nausea and vomiting. Negative for abdominal pain. Genitourinary: Negative for dysuria and flank pain. Skin: Negative for rash and wound. Neurological: Positive for dizziness and light-headedness. Negative for weakness, numbness and headaches. Except as noted above the remainder of the review of systems was reviewedand negative.        PAST MEDICAL HISTORY     Past Medical History:   Diagnosis Date  Fx ankle     X 15 yrs ago per PT    GERD (gastroesophageal reflux disease)     sees Ina Peacock, had one biopsy which shared [facility]'s, gets EGD surveillance q3 years    Hyperlipidemia     Hypertension     Joint pain          SURGICAL HISTORY       Past Surgical History:   Procedure Laterality Date    ABDOMINOPLASTY      CATARACT REMOVAL Right 2-2015    JOINT REPLACEMENT Left 08/03/15    left total hip arthroplasty, anterior approach    TOTAL HIP ARTHROPLASTY      almost a year ago per PT    UPPER GASTROINTESTINAL ENDOSCOPY  5/2012         CURRENT MEDICATIONS       Previous Medications    TRIAMTERENE-HYDROCHLOROTHIAZIDE (MAXZIDE-25) 37.5-25 MG PER TABLET    TAKE ONE TABLET BY MOUTH DAILY       ALLERGIES     Shellfish allergy    FAMILY HISTORY       Family History   Problem Relation Age of Onset    Stroke Mother     Stroke Father     Heart Disease Sister     High Blood Pressure Sister     Diabetes Sister     High Cholesterol Sister     Cancer Sister     High Blood Pressure Brother     Heart Disease Brother     High Cholesterol Brother     Diabetes Brother           SOCIAL HISTORY       Social History     Socioeconomic History    Marital status:      Spouse name: None    Number of children: None    Years of education: None    Highest education level: None   Occupational History    Occupation: homemaker   Social Needs    Financial resource strain: None    Food insecurity:     Worry: None     Inability: None    Transportation needs:     Medical: None     Non-medical: None   Tobacco Use    Smoking status: Never Smoker    Smokeless tobacco: Never Used   Substance and Sexual Activity    Alcohol use:  Yes     Alcohol/week: 0.0 standard drinks     Comment: rare    Drug use: None    Sexual activity: None   Lifestyle    Physical activity:     Days per week: None     Minutes per session: None    Stress: None   Relationships    Social connections:     Talks on phone: None     Gets together: None     Attends Yarsanism service: None     Active member of club or organization: None     Attends meetings of clubs or organizations: None     Relationship status: None    Intimate partner violence:     Fear of current or ex partner: None     Emotionally abused: None     Physically abused: None     Forced sexual activity: None   Other Topics Concern    None   Social History Narrative    None       SCREENINGS             PHYSICAL EXAM    (up to 7 for level 4, 8 ormore for level 5)     ED Triage Vitals [09/23/19 0626]   BP Temp Temp Source Pulse Resp SpO2 Height Weight   127/75 97.1 °F (36.2 °C) Oral 83 12 93 % -- --       Physical Exam   Constitutional: She is oriented to person, place, and time. She appears well-developed and well-nourished. She is cooperative. Non-toxic appearance. She does not have a sickly appearance. She does not appear ill. No distress. Sitting in bed appearing uncomfortable, actively dry heaving      HENT:   Head: Normocephalic and atraumatic. Right Ear: Tympanic membrane normal.   Left Ear: Tympanic membrane normal.   Mouth/Throat: Oropharynx is clear and moist.   Eyes: Pupils are equal, round, and reactive to light. Conjunctivae are normal. Right eye exhibits nystagmus. Neck: Normal range of motion. Neck supple. Cardiovascular: Normal rate, regular rhythm, normal heart sounds and intact distal pulses. Exam reveals no gallop and no friction rub. No murmur heard. Pulmonary/Chest: Effort normal and breath sounds normal. No respiratory distress. She has no decreased breath sounds. She has no wheezes. She has no rhonchi. She has no rales. Abdominal: Soft. Normal appearance and bowel sounds are normal. She exhibits no distension. There is no tenderness. There is no rebound and no guarding. Musculoskeletal: Normal range of motion. She exhibits no edema, tenderness or deformity. Neurological: She is alert and oriented to person, place, and time.  She has normal Romberg and was unable to ambulate without assistance. States that she normally walks on her own walks 2 miles a day and so this is very unusual for her. Given that patient still unsteady gait, felt the patient may warrant inpatient work-up for possible evaluation of vertigo and dizziness.  -Plan for admission for further workup and treatment discussed with patient and family. Patient and family in agreement with plan and have nofurther questions/concerns      REASSESSMENT      Well appearing, non toxic, alert, oriented speaking in full sentences and hemodynamically stable upon discharge      CRITICAL CARE TIME   Total Critical Care time was 0 minutes, excluding separately reportableprocedures. There was a high probability of clinicallysignificant/life threatening deterioration in the patient's condition which required my urgent intervention. CONSULTS:  IP CONSULT TO HOSPITALIST    PROCEDURES:  Unless otherwise noted below, none     Procedures    FINAL IMPRESSION      1. Nausea and vomiting, intractability of vomiting not specified, unspecified vomiting type    2. Vertigo          DISPOSITION/PLAN   DISPOSITION        PATIENT REFERREDTO:  No follow-up provider specified.     DISCHARGE MEDICATIONS:  New Prescriptions    No medications on file          (Please note that portions of this note were completed with a voice recognition program.  Efforts were made to edit the dictations but occasionally wordsare mis-transcribed.)    Star Cowden, MD (electronically signed)  Attending Emergency Physician            Star Cowden, MD  09/23/19 0157

## 2019-09-24 VITALS
HEART RATE: 77 BPM | BODY MASS INDEX: 29.59 KG/M2 | OXYGEN SATURATION: 97 % | WEIGHT: 167 LBS | RESPIRATION RATE: 16 BRPM | DIASTOLIC BLOOD PRESSURE: 73 MMHG | TEMPERATURE: 98.5 F | SYSTOLIC BLOOD PRESSURE: 116 MMHG | HEIGHT: 63 IN

## 2019-09-24 LAB
ANION GAP SERPL CALCULATED.3IONS-SCNC: 9 MMOL/L (ref 3–16)
BUN BLDV-MCNC: 16 MG/DL (ref 7–20)
CALCIUM SERPL-MCNC: 9.1 MG/DL (ref 8.3–10.6)
CHLORIDE BLD-SCNC: 107 MMOL/L (ref 99–110)
CO2: 27 MMOL/L (ref 21–32)
CREAT SERPL-MCNC: 0.9 MG/DL (ref 0.6–1.2)
EKG ATRIAL RATE: 72 BPM
EKG DIAGNOSIS: NORMAL
EKG P AXIS: 13 DEGREES
EKG P-R INTERVAL: 198 MS
EKG Q-T INTERVAL: 436 MS
EKG QRS DURATION: 112 MS
EKG QTC CALCULATION (BAZETT): 477 MS
EKG R AXIS: 5 DEGREES
EKG T AXIS: 5 DEGREES
EKG VENTRICULAR RATE: 72 BPM
GFR AFRICAN AMERICAN: >60
GFR NON-AFRICAN AMERICAN: >60
GLUCOSE BLD-MCNC: 94 MG/DL (ref 70–99)
POTASSIUM REFLEX MAGNESIUM: 4 MMOL/L (ref 3.5–5.1)
SODIUM BLD-SCNC: 143 MMOL/L (ref 136–145)

## 2019-09-24 PROCEDURE — 93010 ELECTROCARDIOGRAM REPORT: CPT | Performed by: INTERNAL MEDICINE

## 2019-09-24 PROCEDURE — G0378 HOSPITAL OBSERVATION PER HR: HCPCS

## 2019-09-24 PROCEDURE — 36415 COLL VENOUS BLD VENIPUNCTURE: CPT

## 2019-09-24 PROCEDURE — 6370000000 HC RX 637 (ALT 250 FOR IP)

## 2019-09-24 PROCEDURE — 80048 BASIC METABOLIC PNL TOTAL CA: CPT

## 2019-09-24 PROCEDURE — 2580000003 HC RX 258: Performed by: NURSE PRACTITIONER

## 2019-09-24 RX ORDER — ACETAMINOPHEN 325 MG/1
TABLET ORAL
Status: COMPLETED
Start: 2019-09-24 | End: 2019-09-24

## 2019-09-24 RX ORDER — ACETAMINOPHEN 325 MG/1
650 TABLET ORAL EVERY 4 HOURS PRN
Status: DISCONTINUED | OUTPATIENT
Start: 2019-09-24 | End: 2019-09-24 | Stop reason: HOSPADM

## 2019-09-24 RX ORDER — ONDANSETRON 4 MG/1
4 TABLET, FILM COATED ORAL DAILY PRN
Qty: 30 TABLET | Refills: 0 | Status: SHIPPED | OUTPATIENT
Start: 2019-09-24 | End: 2019-10-09

## 2019-09-24 RX ORDER — MECLIZINE HCL 12.5 MG/1
12.5 TABLET ORAL 3 TIMES DAILY PRN
Qty: 15 TABLET | Refills: 0 | Status: SHIPPED | OUTPATIENT
Start: 2019-09-24 | End: 2019-10-04

## 2019-09-24 RX ADMIN — ACETAMINOPHEN 650 MG: 325 TABLET ORAL at 10:15

## 2019-09-24 RX ADMIN — SODIUM CHLORIDE: 9 INJECTION, SOLUTION INTRAVENOUS at 02:15

## 2019-09-24 ASSESSMENT — PAIN SCALES - GENERAL: PAINLEVEL_OUTOF10: 8

## 2019-09-24 NOTE — H&P
cyanosis or edema bilaterally. Full range of motion without deformity. Skin: Skin color, texture, turgor normal.  No rashes or lesions. Neurologic:  Neurovascularly intact without any focal sensory/motor deficits. Cranial nerves: II-XII intact, grossly non-focal.  Psychiatric:  Alert and oriented, thought content appropriate, normal insight  Capillary Refill: Brisk,< 3 seconds   Peripheral Pulses: +2 palpable, equal bilaterally       Labs:     Recent Labs     09/23/19  0640   WBC 7.6   HGB 15.0   HCT 43.6        Recent Labs     09/23/19  0640      K 3.2*      CO2 26   BUN 22*   CREATININE 1.1   CALCIUM 10.1     No results for input(s): AST, ALT, BILIDIR, BILITOT, ALKPHOS in the last 72 hours. No results for input(s): INR in the last 72 hours. Recent Labs     09/23/19  0640   TROPONINI <0.01       Urinalysis:      Lab Results   Component Value Date    NITRU Negative 09/23/2019    WBCUA None seen 07/22/2015    RBCUA 0-2 07/22/2015    BLOODU Negative 09/23/2019    SPECGRAV 1.015 09/23/2019    GLUCOSEU Negative 09/23/2019       Radiology:       CT Head WO Contrast   Final Result   No acute intracranial abnormality. XR CHEST STANDARD (2 VW)   Final Result   No acute cardiopulmonary abnormality is identified. At the inferolateral left lung base, possible pulmonary nodule. Therefore, a   nonemergent follow-up CT scan of the chest is recommended for further   evaluation. This can be performed without IV contrast.             ASSESSMENT:    Active Hospital Problems    Diagnosis Date Noted    Vertigo [R42] 09/23/2019         PLAN:    Nausea vomiting and dizziness perhaps initially related to gastritis which led to dehydration onset of vertigo. Symptoms seem to have resolved with medical management of IV fluid antiemetics and meclizine. She did have some minor hypokalemia which was replaced in the emergency department.   We will continue IV fluids and monitor overnight suspect she will

## 2019-09-24 NOTE — DISCHARGE SUMMARY
Hospital Medicine Discharge Summary    Patient ID: Zuleyma Ariza      Patient's PCP: Maria Antonia Hilton MD    Admit Date: 9/23/2019     Discharge Date:   9/24/19    Admitting Physician: John Harrison MD     Discharge Physician: AVELINO Higginbotham CNP     Discharge Diagnoses: Active Hospital Problems    Diagnosis    Vertigo [R42]       The patient was seen and examined on day of discharge and this discharge summary is in conjunction with any daily progress note from day of discharge. Hospital Course:   History Of Present Illness:    70 y.o. female who presented to [facility] emergency department with complaint/reports of nausea vomiting and dizziness since Saturday. Patient noted she had a meal at a restaurant had nausea vomiting and diarrhea felt poorly was able to sleep felt better on Sunday however she woke up this morning with what she described as white noise and fullness in her right ear as well as room spinning dizziness and nausea and vomiting. She also endorsed generalized weakness and blurred vision. She was treated for vertigo with IV fluids Valium meclizine and Benadryl in the emergency department. An ambulation trial was attempted and the patient still complained of dizziness and was fearful of going home. She was admitted under observation by the hospitalist.  On the time of my assessment she had already walked with physical therapy did well with a walker. Was tolerating p.o. and her symptoms had resolved. While she was in the emergency department she went underwent a head CT without acute findings. We discussed the possibility of going home this evening as she does have a walker at home for safety. She preferred to spend the night and continue to receive IV fluids and antiemetics if needed.     Nausea vomiting and dizziness perhaps initially related to gastritis which led to dehydration onset of vertigo.   - Head CT in the ED w/o acute findings   -  Symptoms resolved with medical management of IV fluid antiemetics and meclizine. She did have some minor hypokalemia which was replaced in the emergency department. Continued IV fluids and monitor overnight. She had no acute events during observation. On the day of D/C she was feeling well, stable VS, no nausea or dizziness. -  She was evaluated by PT OT with no further recommendations. -           Physical Exam Performed:     /73   Pulse 77   Temp 98.5 °F (36.9 °C) (Oral)   Resp 16   Ht 5' 3\" (1.6 m)   Wt 167 lb (75.8 kg)   SpO2 97%   BMI 29.58 kg/m²       General appearance:  No apparent distress, appears stated age and cooperative. HEENT:  Normal cephalic, atraumatic without obvious deformity. Pupils equal, round, and reactive to light. Extra ocular muscles intact. Conjunctivae/corneas clear. Neck: Supple, with full range of motion. No jugular venous distention. Trachea midline. Respiratory:  Normal respiratory effort. Clear to auscultation, bilaterally without Rales/Wheezes/Rhonchi. Cardiovascular:  Regular rate and rhythm with normal S1/S2 without murmurs, rubs or gallops. Abdomen: Soft, non-tender, non-distended with normal bowel sounds. Musculoskeletal:  No clubbing, cyanosis or edema bilaterally. Full range of motion without deformity. Skin: Skin color, texture, turgor normal.  No rashes or lesions. Neurologic:  Neurovascularly intact without any focal sensory/motor deficits. Cranial nerves: II-XII intact, grossly non-focal.  Psychiatric:  Alert and oriented, thought content appropriate, normal insight  Capillary Refill: Brisk,< 3 seconds   Peripheral Pulses: +2 palpable, equal bilaterally       Labs:  For convenience and continuity at follow-up the following most recent labs are provided:      CBC:    Lab Results   Component Value Date    WBC 7.6 09/23/2019    HGB 15.0 09/23/2019    HCT 43.6 09/23/2019     09/23/2019       Renal:    Lab Results   Component Value Date     09/24/2019    K 4.0 09/24/2019     09/24/2019    CO2 27 09/24/2019    BUN 16 09/24/2019    CREATININE 0.9 09/24/2019    CALCIUM 9.1 09/24/2019         Significant Diagnostic Studies    Radiology:   CT Head WO Contrast   Final Result   No acute intracranial abnormality. XR CHEST STANDARD (2 VW)   Final Result   No acute cardiopulmonary abnormality is identified. At the inferolateral left lung base, possible pulmonary nodule. Therefore, a   nonemergent follow-up CT scan of the chest is recommended for further   evaluation. This can be performed without IV contrast.                Consults:     IP CONSULT TO HOSPITALIST    Disposition:  Home     Condition at Discharge: Stable    Discharge Instructions/Follow-up:  PCP    Code Status:  Full Code     Activity: activity as tolerated    Diet: regular diet      Discharge Medications:     Current Discharge Medication List           Details   triamterene-hydrochlorothiazide (MAXZIDE-25) 37.5-25 MG per tablet TAKE ONE TABLET BY MOUTH DAILY  Qty: 90 tablet, Refills: 2    Associated Diagnoses: Essential hypertension             Time Spent on discharge is more than 30 minutes in the examination, evaluation, counseling and review of medications and discharge plan. Signed:    AVELINO May - CNP   9/24/2019      Thank you Bobby Chery MD for the opportunity to be involved in this patient's care. If you have any questions or concerns please feel free to contact me at [phone].

## 2019-09-25 ENCOUNTER — TELEPHONE (OUTPATIENT)
Dept: FAMILY MEDICINE CLINIC | Age: 79
End: 2019-09-25

## 2019-09-30 ENCOUNTER — OFFICE VISIT (OUTPATIENT)
Dept: FAMILY MEDICINE CLINIC | Age: 79
End: 2019-09-30
Payer: MEDICARE

## 2019-09-30 VITALS
SYSTOLIC BLOOD PRESSURE: 130 MMHG | HEIGHT: 63 IN | HEART RATE: 89 BPM | WEIGHT: 173 LBS | OXYGEN SATURATION: 97 % | BODY MASS INDEX: 30.65 KG/M2 | DIASTOLIC BLOOD PRESSURE: 70 MMHG

## 2019-09-30 DIAGNOSIS — J40 BRONCHITIS: ICD-10-CM

## 2019-09-30 DIAGNOSIS — H65.01 RIGHT ACUTE SEROUS OTITIS MEDIA, RECURRENCE NOT SPECIFIED: Primary | ICD-10-CM

## 2019-09-30 PROCEDURE — 4040F PNEUMOC VAC/ADMIN/RCVD: CPT | Performed by: FAMILY MEDICINE

## 2019-09-30 PROCEDURE — 1090F PRES/ABSN URINE INCON ASSESS: CPT | Performed by: FAMILY MEDICINE

## 2019-09-30 PROCEDURE — G8427 DOCREV CUR MEDS BY ELIG CLIN: HCPCS | Performed by: FAMILY MEDICINE

## 2019-09-30 PROCEDURE — 1123F ACP DISCUSS/DSCN MKR DOCD: CPT | Performed by: FAMILY MEDICINE

## 2019-09-30 PROCEDURE — 1036F TOBACCO NON-USER: CPT | Performed by: FAMILY MEDICINE

## 2019-09-30 PROCEDURE — G8399 PT W/DXA RESULTS DOCUMENT: HCPCS | Performed by: FAMILY MEDICINE

## 2019-09-30 PROCEDURE — 99214 OFFICE O/P EST MOD 30 MIN: CPT | Performed by: FAMILY MEDICINE

## 2019-09-30 PROCEDURE — G8417 CALC BMI ABV UP PARAM F/U: HCPCS | Performed by: FAMILY MEDICINE

## 2019-09-30 RX ORDER — AZITHROMYCIN 250 MG/1
250 TABLET, FILM COATED ORAL DAILY
Qty: 1 PACKET | Refills: 0 | Status: SHIPPED | OUTPATIENT
Start: 2019-09-30 | End: 2019-10-09

## 2019-09-30 RX ORDER — PREDNISONE 20 MG/1
20 TABLET ORAL 2 TIMES DAILY
Qty: 14 TABLET | Refills: 0 | Status: SHIPPED | OUTPATIENT
Start: 2019-09-30 | End: 2019-10-07

## 2019-09-30 ASSESSMENT — ENCOUNTER SYMPTOMS
WHEEZING: 0
ABDOMINAL PAIN: 0
NAUSEA: 0
SINUS PAIN: 0
SINUS PRESSURE: 1
VOMITING: 0
COUGH: 1
SHORTNESS OF BREATH: 0
SORE THROAT: 1

## 2019-09-30 NOTE — PROGRESS NOTES
Patient:  Scout Pedroza a 70 y.o. femalewho presents today with the following Chief Complaint(s):  Chief Complaint   Patient presents with    Follow-Up from Hospital     Patient was admitted on 9/23 and dischared on 9/24. Patient is here for hospital follow-up. She was admitted from 9/23/2019 to 9/24/2019 for nausea vomiting dizziness and neck pain. Patient no longer has nausea or vomiting. She does continue to feel pressure in her right ear and her sinuses. She states that initially when she was admitted she could not hear out of her right ear, she can now hear but it muffled. She denies nasal drainage but does feel postnasal drip. She has a dry cough. She denies a rattle in her chest.  She did have a chest x-ray that was reviewed. There is no infiltrate but she did a possible nodule the left base patient says she was vomiting violently which seemed to have caused her neck pain. She is been taking ibuprofen and that has helped somewhat. Inpatient labs, CT of the head and chest x-ray were reviewed        Current Outpatient Medications   Medication Sig Dispense Refill    predniSONE (DELTASONE) 20 MG tablet Take 1 tablet by mouth 2 times daily for 7 days 14 tablet 0    azithromycin (ZITHROMAX Z-DENIA) 250 MG tablet Take 1 tablet by mouth daily 2 po today, then 1 po daily for 4 days 1 packet 0    triamterene-hydrochlorothiazide (MAXZIDE-25) 37.5-25 MG per tablet TAKE ONE TABLET BY MOUTH DAILY 90 tablet 2    meclizine (ANTIVERT) 12.5 MG tablet Take 1 tablet by mouth 3 times daily as needed for Dizziness (Patient not taking: Reported on 9/30/2019) 15 tablet 0    ondansetron (ZOFRAN) 4 MG tablet Take 1 tablet by mouth daily as needed for Nausea or Vomiting (Patient not taking: Reported on 9/30/2019) 30 tablet 0     No current facility-administered medications for this visit.         Patients past medical history, surgical history, family history, medications and allergies were all reviewed and

## 2019-10-09 ENCOUNTER — OFFICE VISIT (OUTPATIENT)
Dept: ENT CLINIC | Age: 79
End: 2019-10-09
Payer: MEDICARE

## 2019-10-09 ENCOUNTER — OFFICE VISIT (OUTPATIENT)
Dept: FAMILY MEDICINE CLINIC | Age: 79
End: 2019-10-09
Payer: MEDICARE

## 2019-10-09 VITALS
DIASTOLIC BLOOD PRESSURE: 76 MMHG | SYSTOLIC BLOOD PRESSURE: 125 MMHG | TEMPERATURE: 96.9 F | WEIGHT: 174.4 LBS | BODY MASS INDEX: 30.9 KG/M2 | HEART RATE: 65 BPM | HEIGHT: 63 IN

## 2019-10-09 VITALS
SYSTOLIC BLOOD PRESSURE: 122 MMHG | HEART RATE: 69 BPM | BODY MASS INDEX: 30.65 KG/M2 | DIASTOLIC BLOOD PRESSURE: 82 MMHG | HEIGHT: 63 IN | OXYGEN SATURATION: 98 % | WEIGHT: 173 LBS

## 2019-10-09 DIAGNOSIS — H65.01 RIGHT ACUTE SEROUS OTITIS MEDIA, RECURRENCE NOT SPECIFIED: Primary | ICD-10-CM

## 2019-10-09 DIAGNOSIS — R91.1 PULMONARY NODULE: ICD-10-CM

## 2019-10-09 DIAGNOSIS — H83.01 ACUTE LABYRINTHITIS, RIGHT: ICD-10-CM

## 2019-10-09 DIAGNOSIS — H65.21 RIGHT CHRONIC SEROUS OTITIS MEDIA: ICD-10-CM

## 2019-10-09 DIAGNOSIS — H91.21 SUDDEN RIGHT HEARING LOSS: Primary | ICD-10-CM

## 2019-10-09 PROCEDURE — G8484 FLU IMMUNIZE NO ADMIN: HCPCS | Performed by: FAMILY MEDICINE

## 2019-10-09 PROCEDURE — G8417 CALC BMI ABV UP PARAM F/U: HCPCS | Performed by: FAMILY MEDICINE

## 2019-10-09 PROCEDURE — G8399 PT W/DXA RESULTS DOCUMENT: HCPCS | Performed by: FAMILY MEDICINE

## 2019-10-09 PROCEDURE — 1123F ACP DISCUSS/DSCN MKR DOCD: CPT | Performed by: FAMILY MEDICINE

## 2019-10-09 PROCEDURE — G8427 DOCREV CUR MEDS BY ELIG CLIN: HCPCS | Performed by: FAMILY MEDICINE

## 2019-10-09 PROCEDURE — 99214 OFFICE O/P EST MOD 30 MIN: CPT | Performed by: FAMILY MEDICINE

## 2019-10-09 PROCEDURE — 1090F PRES/ABSN URINE INCON ASSESS: CPT | Performed by: FAMILY MEDICINE

## 2019-10-09 PROCEDURE — 1036F TOBACCO NON-USER: CPT | Performed by: FAMILY MEDICINE

## 2019-10-09 PROCEDURE — 4040F PNEUMOC VAC/ADMIN/RCVD: CPT | Performed by: FAMILY MEDICINE

## 2019-10-09 PROCEDURE — 99203 OFFICE O/P NEW LOW 30 MIN: CPT | Performed by: OTOLARYNGOLOGY

## 2019-10-09 ASSESSMENT — ENCOUNTER SYMPTOMS
TROUBLE SWALLOWING: 0
VOICE CHANGE: 0
SHORTNESS OF BREATH: 0
COUGH: 0
SORE THROAT: 0
APNEA: 0
COUGH: 1
SINUS PRESSURE: 0
FACIAL SWELLING: 0
EYE ITCHING: 0

## 2019-10-10 ENCOUNTER — PROCEDURE VISIT (OUTPATIENT)
Dept: AUDIOLOGY | Age: 79
End: 2019-10-10
Payer: MEDICARE

## 2019-10-10 DIAGNOSIS — H90.3 SENSORINEURAL HEARING LOSS, BILATERAL: Primary | ICD-10-CM

## 2019-10-10 DIAGNOSIS — H93.11 TINNITUS, RIGHT EAR: ICD-10-CM

## 2019-10-10 DIAGNOSIS — R42 DIZZINESS AND GIDDINESS: ICD-10-CM

## 2019-10-10 DIAGNOSIS — H93.8X1 EAR PRESSURE, RIGHT: ICD-10-CM

## 2019-10-10 PROCEDURE — 92557 COMPREHENSIVE HEARING TEST: CPT | Performed by: AUDIOLOGIST

## 2019-10-10 PROCEDURE — 92567 TYMPANOMETRY: CPT | Performed by: AUDIOLOGIST

## 2019-10-11 ENCOUNTER — HOSPITAL ENCOUNTER (OUTPATIENT)
Dept: CT IMAGING | Age: 79
Discharge: HOME | End: 2019-10-11
Payer: MEDICARE

## 2019-10-11 DIAGNOSIS — R91.1 PULMONARY NODULE: ICD-10-CM

## 2019-10-11 PROCEDURE — 71250 CT THORAX DX C-: CPT

## 2019-10-22 ENCOUNTER — OFFICE VISIT (OUTPATIENT)
Dept: FAMILY MEDICINE CLINIC | Age: 79
End: 2019-10-22
Payer: MEDICARE

## 2019-10-22 ENCOUNTER — TELEPHONE (OUTPATIENT)
Dept: FAMILY MEDICINE CLINIC | Age: 79
End: 2019-10-22

## 2019-10-22 VITALS
HEART RATE: 75 BPM | BODY MASS INDEX: 30.65 KG/M2 | SYSTOLIC BLOOD PRESSURE: 108 MMHG | WEIGHT: 173 LBS | OXYGEN SATURATION: 96 % | DIASTOLIC BLOOD PRESSURE: 68 MMHG | HEIGHT: 63 IN

## 2019-10-22 DIAGNOSIS — I10 ESSENTIAL HYPERTENSION: ICD-10-CM

## 2019-10-22 PROCEDURE — G8427 DOCREV CUR MEDS BY ELIG CLIN: HCPCS | Performed by: FAMILY MEDICINE

## 2019-10-22 PROCEDURE — 99213 OFFICE O/P EST LOW 20 MIN: CPT | Performed by: FAMILY MEDICINE

## 2019-10-22 PROCEDURE — 1090F PRES/ABSN URINE INCON ASSESS: CPT | Performed by: FAMILY MEDICINE

## 2019-10-22 PROCEDURE — 4040F PNEUMOC VAC/ADMIN/RCVD: CPT | Performed by: FAMILY MEDICINE

## 2019-10-22 PROCEDURE — 1123F ACP DISCUSS/DSCN MKR DOCD: CPT | Performed by: FAMILY MEDICINE

## 2019-10-22 PROCEDURE — G8399 PT W/DXA RESULTS DOCUMENT: HCPCS | Performed by: FAMILY MEDICINE

## 2019-10-22 PROCEDURE — 1036F TOBACCO NON-USER: CPT | Performed by: FAMILY MEDICINE

## 2019-10-22 PROCEDURE — G8417 CALC BMI ABV UP PARAM F/U: HCPCS | Performed by: FAMILY MEDICINE

## 2019-10-22 PROCEDURE — G8484 FLU IMMUNIZE NO ADMIN: HCPCS | Performed by: FAMILY MEDICINE

## 2019-10-22 RX ORDER — TRIAMTERENE AND HYDROCHLOROTHIAZIDE 37.5; 25 MG/1; MG/1
TABLET ORAL
Qty: 90 TABLET | Refills: 1 | Status: SHIPPED | OUTPATIENT
Start: 2019-10-22

## 2019-10-22 NOTE — TELEPHONE ENCOUNTER
Alex Justin [facility]) called and stated that they need orders for a US of breast with mammo & possible biopsy of axilla. The orders are based on what the CT results stated. Please place orders.  Call Alex Justin with any questions [phone]

## 2019-10-30 ENCOUNTER — TELEPHONE (OUTPATIENT)
Dept: FAMILY MEDICINE CLINIC | Age: 79
End: 2019-10-30

## 2019-11-26 LAB — PATHOLOGY REPORT: NORMAL

## 2020-01-02 ENCOUNTER — TELEPHONE (OUTPATIENT)
Dept: FAMILY MEDICINE CLINIC | Age: 80
End: 2020-01-02

## 2020-06-29 ENCOUNTER — CARE COORDINATION (OUTPATIENT)
Dept: CASE MANAGEMENT | Age: 80
End: 2020-06-29

## 2025-07-22 ENCOUNTER — HOSPITAL ENCOUNTER (INPATIENT)
Age: 85
LOS: 3 days | Discharge: HOME HEALTH | End: 2025-07-25
Attending: STUDENT IN AN ORGANIZED HEALTH CARE EDUCATION/TRAINING PROGRAM | Admitting: INTERNAL MEDICINE
Payer: MEDICARE

## 2025-07-22 DIAGNOSIS — R06.09 OTHER FORMS OF DYSPNEA: ICD-10-CM

## 2025-07-22 DIAGNOSIS — I26.02 ACUTE SADDLE PULMONARY EMBOLISM WITH ACUTE COR PULMONALE (HCC): Primary | ICD-10-CM

## 2025-07-22 DIAGNOSIS — I26.99 PULMONARY EMBOLUS AND INFARCTION (HCC): ICD-10-CM

## 2025-07-22 DIAGNOSIS — I26.01 SEPTIC PULMONARY EMBOLISM WITH ACUTE COR PULMONALE, UNSPECIFIED CHRONICITY (HCC): ICD-10-CM

## 2025-07-22 DIAGNOSIS — N17.9 AKI (ACUTE KIDNEY INJURY): ICD-10-CM

## 2025-07-22 DIAGNOSIS — R65.10 SIRS (SYSTEMIC INFLAMMATORY RESPONSE SYNDROME) (HCC): ICD-10-CM

## 2025-07-22 PROBLEM — I26.92 ACUTE SADDLE PULMONARY EMBOLISM, UNSPECIFIED WHETHER ACUTE COR PULMONALE PRESENT (HCC): Status: ACTIVE | Noted: 2025-07-22

## 2025-07-22 LAB
ALBUMIN SERPL-MCNC: 4 G/DL (ref 3.4–5)
ALBUMIN/GLOB SERPL: 1.2 {RATIO} (ref 1.1–2.2)
ALP SERPL-CCNC: 83 U/L (ref 40–129)
ALT SERPL-CCNC: 17 U/L (ref 10–40)
ANION GAP SERPL CALCULATED.3IONS-SCNC: 14 MMOL/L (ref 3–16)
ANISOCYTOSIS BLD QL SMEAR: ABNORMAL
ANTI-XA UNFRAC HEPARIN: <0.1 IU/ML (ref 0.3–0.7)
APTT BLD: 28.2 SEC (ref 22.8–35.8)
AST SERPL-CCNC: 28 U/L (ref 15–37)
BASOPHILS # BLD: 0 K/UL (ref 0–0.2)
BASOPHILS NFR BLD: 0 %
BILIRUB SERPL-MCNC: 0.7 MG/DL (ref 0–1)
BUN SERPL-MCNC: 22 MG/DL (ref 7–20)
CALCIUM SERPL-MCNC: 9.4 MG/DL (ref 8.3–10.6)
CHLORIDE SERPL-SCNC: 96 MMOL/L (ref 99–110)
CO2 SERPL-SCNC: 25 MMOL/L (ref 21–32)
CREAT SERPL-MCNC: 1.4 MG/DL (ref 0.6–1.2)
DEPRECATED RDW RBC AUTO: 12.6 % (ref 12.4–15.4)
EKG ATRIAL RATE: 104 BPM
EKG DIAGNOSIS: NORMAL
EKG P AXIS: 38 DEGREES
EKG P-R INTERVAL: 192 MS
EKG Q-T INTERVAL: 352 MS
EKG QRS DURATION: 98 MS
EKG QTC CALCULATION (BAZETT): 462 MS
EKG R AXIS: 5 DEGREES
EKG T AXIS: -2 DEGREES
EKG VENTRICULAR RATE: 104 BPM
EOSINOPHIL # BLD: 0 K/UL (ref 0–0.6)
EOSINOPHIL NFR BLD: 0 %
GFR SERPLBLD CREATININE-BSD FMLA CKD-EPI: 37 ML/MIN/{1.73_M2}
GLUCOSE SERPL-MCNC: 107 MG/DL (ref 70–99)
HCT VFR BLD AUTO: 36.8 % (ref 36–48)
HGB BLD-MCNC: 12.7 G/DL (ref 12–16)
LACTATE BLDV-SCNC: 1.1 MMOL/L (ref 0.4–2)
LYMPHOCYTES # BLD: 1.1 K/UL (ref 1–5.1)
LYMPHOCYTES NFR BLD: 9 %
MACROCYTES BLD QL SMEAR: ABNORMAL
MCH RBC QN AUTO: 31.4 PG (ref 26–34)
MCHC RBC AUTO-ENTMCNC: 34.5 G/DL (ref 31–36)
MCV RBC AUTO: 91 FL (ref 80–100)
MICROCYTES BLD QL SMEAR: ABNORMAL
MONOCYTES # BLD: 1.3 K/UL (ref 0–1.3)
MONOCYTES NFR BLD: 11 %
NEUTROPHILS # BLD: 9 K/UL (ref 1.7–7.7)
NEUTROPHILS NFR BLD: 65 %
NEUTS BAND NFR BLD MANUAL: 14 % (ref 0–7)
OVALOCYTES BLD QL SMEAR: ABNORMAL
PLATELET # BLD AUTO: 139 K/UL (ref 135–450)
PLATELET BLD QL SMEAR: ADEQUATE
PMV BLD AUTO: 8.4 FL (ref 5–10.5)
POIKILOCYTOSIS BLD QL SMEAR: ABNORMAL
POLYCHROMASIA BLD QL SMEAR: ABNORMAL
POTASSIUM SERPL-SCNC: 3.6 MMOL/L (ref 3.5–5.1)
PROT SERPL-MCNC: 7.3 G/DL (ref 6.4–8.2)
RBC # BLD AUTO: 4.05 M/UL (ref 4–5.2)
SLIDE REVIEW: ABNORMAL
SODIUM SERPL-SCNC: 135 MMOL/L (ref 136–145)
TROPONIN, HIGH SENSITIVITY: 38 NG/L (ref 0–14)
VARIANT LYMPHS NFR BLD MANUAL: 1 % (ref 0–6)
WBC # BLD AUTO: 11.4 K/UL (ref 4–11)

## 2025-07-22 PROCEDURE — 2580000003 HC RX 258: Performed by: INTERNAL MEDICINE

## 2025-07-22 PROCEDURE — 93005 ELECTROCARDIOGRAM TRACING: CPT

## 2025-07-22 PROCEDURE — 96375 TX/PRO/DX INJ NEW DRUG ADDON: CPT

## 2025-07-22 PROCEDURE — 2500000003 HC RX 250 WO HCPCS

## 2025-07-22 PROCEDURE — 99285 EMERGENCY DEPT VISIT HI MDM: CPT

## 2025-07-22 PROCEDURE — 85520 HEPARIN ASSAY: CPT

## 2025-07-22 PROCEDURE — 93010 ELECTROCARDIOGRAM REPORT: CPT | Performed by: INTERNAL MEDICINE

## 2025-07-22 PROCEDURE — 2580000003 HC RX 258

## 2025-07-22 PROCEDURE — 6360000002 HC RX W HCPCS

## 2025-07-22 PROCEDURE — 80053 COMPREHEN METABOLIC PANEL: CPT

## 2025-07-22 PROCEDURE — 2060000000 HC ICU INTERMEDIATE R&B

## 2025-07-22 PROCEDURE — 85730 THROMBOPLASTIN TIME PARTIAL: CPT

## 2025-07-22 PROCEDURE — 85025 COMPLETE CBC W/AUTO DIFF WBC: CPT

## 2025-07-22 PROCEDURE — 84484 ASSAY OF TROPONIN QUANT: CPT

## 2025-07-22 PROCEDURE — 6370000000 HC RX 637 (ALT 250 FOR IP): Performed by: INTERNAL MEDICINE

## 2025-07-22 PROCEDURE — 2500000003 HC RX 250 WO HCPCS: Performed by: INTERNAL MEDICINE

## 2025-07-22 PROCEDURE — 83605 ASSAY OF LACTIC ACID: CPT

## 2025-07-22 PROCEDURE — 96374 THER/PROPH/DIAG INJ IV PUSH: CPT

## 2025-07-22 PROCEDURE — 96365 THER/PROPH/DIAG IV INF INIT: CPT

## 2025-07-22 RX ORDER — LABETALOL HYDROCHLORIDE 5 MG/ML
5 INJECTION, SOLUTION INTRAVENOUS EVERY 4 HOURS PRN
Status: DISCONTINUED | OUTPATIENT
Start: 2025-07-22 | End: 2025-07-25 | Stop reason: HOSPADM

## 2025-07-22 RX ORDER — POLYETHYLENE GLYCOL 3350 17 G/17G
17 POWDER, FOR SOLUTION ORAL DAILY PRN
Status: DISCONTINUED | OUTPATIENT
Start: 2025-07-22 | End: 2025-07-25 | Stop reason: HOSPADM

## 2025-07-22 RX ORDER — SODIUM CHLORIDE 0.9 % (FLUSH) 0.9 %
5-40 SYRINGE (ML) INJECTION PRN
Status: DISCONTINUED | OUTPATIENT
Start: 2025-07-22 | End: 2025-07-25 | Stop reason: HOSPADM

## 2025-07-22 RX ORDER — SODIUM CHLORIDE 9 MG/ML
INJECTION, SOLUTION INTRAVENOUS CONTINUOUS
Status: DISCONTINUED | OUTPATIENT
Start: 2025-07-22 | End: 2025-07-24

## 2025-07-22 RX ORDER — ACETAMINOPHEN 650 MG/1
650 SUPPOSITORY RECTAL EVERY 6 HOURS PRN
Status: DISCONTINUED | OUTPATIENT
Start: 2025-07-22 | End: 2025-07-25 | Stop reason: HOSPADM

## 2025-07-22 RX ORDER — TRIAMTERENE AND HYDROCHLOROTHIAZIDE 37.5; 25 MG/1; MG/1
1 TABLET ORAL DAILY
Status: DISCONTINUED | OUTPATIENT
Start: 2025-07-23 | End: 2025-07-25 | Stop reason: HOSPADM

## 2025-07-22 RX ORDER — PANTOPRAZOLE SODIUM 20 MG/1
20 TABLET, DELAYED RELEASE ORAL
Status: DISCONTINUED | OUTPATIENT
Start: 2025-07-23 | End: 2025-07-25 | Stop reason: HOSPADM

## 2025-07-22 RX ORDER — PROCHLORPERAZINE EDISYLATE 5 MG/ML
10 INJECTION INTRAMUSCULAR; INTRAVENOUS EVERY 6 HOURS PRN
Status: DISCONTINUED | OUTPATIENT
Start: 2025-07-22 | End: 2025-07-25 | Stop reason: HOSPADM

## 2025-07-22 RX ORDER — TRAZODONE HYDROCHLORIDE 50 MG/1
50 TABLET ORAL NIGHTLY
Status: DISCONTINUED | OUTPATIENT
Start: 2025-07-22 | End: 2025-07-25 | Stop reason: HOSPADM

## 2025-07-22 RX ORDER — SODIUM CHLORIDE 9 MG/ML
INJECTION, SOLUTION INTRAVENOUS PRN
Status: DISCONTINUED | OUTPATIENT
Start: 2025-07-22 | End: 2025-07-25 | Stop reason: HOSPADM

## 2025-07-22 RX ORDER — 0.9 % SODIUM CHLORIDE 0.9 %
1000 INTRAVENOUS SOLUTION INTRAVENOUS ONCE
Status: COMPLETED | OUTPATIENT
Start: 2025-07-22 | End: 2025-07-22

## 2025-07-22 RX ORDER — HEPARIN SODIUM 10000 [USP'U]/100ML
5-30 INJECTION, SOLUTION INTRAVENOUS CONTINUOUS
Status: DISCONTINUED | OUTPATIENT
Start: 2025-07-22 | End: 2025-07-24

## 2025-07-22 RX ORDER — AZITHROMYCIN 250 MG/1
500 TABLET, FILM COATED ORAL EVERY 24 HOURS
Status: DISCONTINUED | OUTPATIENT
Start: 2025-07-23 | End: 2025-07-25 | Stop reason: HOSPADM

## 2025-07-22 RX ORDER — OMEPRAZOLE 20 MG/1
20 CAPSULE, DELAYED RELEASE ORAL DAILY
COMMUNITY

## 2025-07-22 RX ORDER — HEPARIN SODIUM 1000 [USP'U]/ML
80 INJECTION, SOLUTION INTRAVENOUS; SUBCUTANEOUS ONCE
Status: COMPLETED | OUTPATIENT
Start: 2025-07-22 | End: 2025-07-22

## 2025-07-22 RX ORDER — POTASSIUM CHLORIDE 1500 MG/1
40 TABLET, EXTENDED RELEASE ORAL PRN
Status: DISCONTINUED | OUTPATIENT
Start: 2025-07-22 | End: 2025-07-25 | Stop reason: HOSPADM

## 2025-07-22 RX ORDER — ACETAMINOPHEN 325 MG/1
650 TABLET ORAL EVERY 6 HOURS PRN
Status: DISCONTINUED | OUTPATIENT
Start: 2025-07-22 | End: 2025-07-25 | Stop reason: HOSPADM

## 2025-07-22 RX ORDER — HEPARIN SODIUM 1000 [USP'U]/ML
40 INJECTION, SOLUTION INTRAVENOUS; SUBCUTANEOUS PRN
Status: DISCONTINUED | OUTPATIENT
Start: 2025-07-22 | End: 2025-07-24

## 2025-07-22 RX ORDER — MAGNESIUM SULFATE IN WATER 40 MG/ML
2000 INJECTION, SOLUTION INTRAVENOUS PRN
Status: DISCONTINUED | OUTPATIENT
Start: 2025-07-22 | End: 2025-07-25 | Stop reason: HOSPADM

## 2025-07-22 RX ORDER — HEPARIN SODIUM 1000 [USP'U]/ML
80 INJECTION, SOLUTION INTRAVENOUS; SUBCUTANEOUS PRN
Status: DISCONTINUED | OUTPATIENT
Start: 2025-07-22 | End: 2025-07-24

## 2025-07-22 RX ORDER — POTASSIUM CHLORIDE 7.45 MG/ML
10 INJECTION INTRAVENOUS PRN
Status: DISCONTINUED | OUTPATIENT
Start: 2025-07-22 | End: 2025-07-25 | Stop reason: HOSPADM

## 2025-07-22 RX ORDER — SODIUM CHLORIDE 0.9 % (FLUSH) 0.9 %
5-40 SYRINGE (ML) INJECTION EVERY 12 HOURS SCHEDULED
Status: DISCONTINUED | OUTPATIENT
Start: 2025-07-22 | End: 2025-07-25 | Stop reason: HOSPADM

## 2025-07-22 RX ADMIN — ACETAMINOPHEN 650 MG: 325 TABLET ORAL at 21:17

## 2025-07-22 RX ADMIN — SODIUM CHLORIDE 1000 ML: 0.9 INJECTION, SOLUTION INTRAVENOUS at 18:34

## 2025-07-22 RX ADMIN — WATER 1000 MG: 1 INJECTION INTRAMUSCULAR; INTRAVENOUS; SUBCUTANEOUS at 18:42

## 2025-07-22 RX ADMIN — HEPARIN SODIUM AND DEXTROSE 18 UNITS/KG/HR: 10000; 5 INJECTION INTRAVENOUS at 18:52

## 2025-07-22 RX ADMIN — HEPARIN SODIUM 6100 UNITS: 1000 INJECTION INTRAVENOUS; SUBCUTANEOUS at 18:48

## 2025-07-22 RX ADMIN — SODIUM CHLORIDE: 0.9 INJECTION, SOLUTION INTRAVENOUS at 21:17

## 2025-07-22 RX ADMIN — SODIUM CHLORIDE, PRESERVATIVE FREE 10 ML: 5 INJECTION INTRAVENOUS at 21:33

## 2025-07-22 RX ADMIN — AZITHROMYCIN MONOHYDRATE 500 MG: 500 INJECTION, POWDER, LYOPHILIZED, FOR SOLUTION INTRAVENOUS at 18:48

## 2025-07-22 ASSESSMENT — LIFESTYLE VARIABLES
HOW MANY STANDARD DRINKS CONTAINING ALCOHOL DO YOU HAVE ON A TYPICAL DAY: PATIENT DOES NOT DRINK
HOW OFTEN DO YOU HAVE A DRINK CONTAINING ALCOHOL: NEVER

## 2025-07-22 ASSESSMENT — PAIN - FUNCTIONAL ASSESSMENT: PAIN_FUNCTIONAL_ASSESSMENT: NONE - DENIES PAIN

## 2025-07-22 NOTE — CONSULTS
Pharmacy to Manage Heparin Infusion per Hospital Nomogram    Dx: Acute PE  Weight: 75.8 kg   Baseline aPTT: ordered    Labs:  Recent Labs     07/22/25  1734   HGB 12.7   HCT 36.8          Heparin (weight-based) Infusion: VTE/DVT/PE  Will initiate heparin therapy with a bolus of 6100 units (80 units/kg) followed by heparin infusion at 18 units/kg/hr  Plan to check anti-Xa in 6 hours.  Goal anti-Xa 0.3 - 0.7 units/mL      Thank you for the consult!   Cady Gary, PharmD, [facility] [id number]    7/23/2025 at 0130  Anti-Xa Unfrac Heparin   >1.10   IU/mL  - Hold Heparin for 1 hour then resume at a decreased rate of _15_ units/kg/hr.  - Recheck Anti-Xa in 6 hours at 0900  Dusty Tapia Pharm D.7/23/2025 4:21 AM    7/23/2025 at 0849  Anti-Xa Unfrac Heparin   >1.10   IU/mL  - Hold Heparin for 1 hour then resume at a decreased rate of _12_ units/kg/hr.  - Recheck Anti-Xa in 6 hours at 1630  Jorge Reyes PharmDARIELA 7/23/2025 9:23 AM    ------------------------------  7/23 1733  High-Dose Heparin Drip  Current Rate: 12 units/kg/hr  7/23 @ 1634 Anti-Xa Level= 1.06  Plan: Per pharmacy dosing, we will HOLD heparin drip x 1 hour and decrease drip by 3 units/kg/hr, making new rate 9 units/kg/hr    Next Anti-Xa Level: 7/23 @ 2330  Kaylee Higgins PharmD 7/23/2025 5:32 PM    7/23  at 2319  anti-Xa level = 0.44 IU/mL  Continue current heparin infusion rate   Recheck level 0530  William Puentes PharmD 7/24/2025  12:09 AM    7/24/2025  at 0424  anti-Xa level = 0.40 IU/mL  Continue current heparin infusion rate   Recheck level tomorrow  William Puentes PharmD 7/24/2025  6:17 AM

## 2025-07-22 NOTE — ED PROVIDER NOTES
[facility] A2 CARD TELEMETRY  EMERGENCY DEPARTMENT ENCOUNTER        Pt Name: Radha Tipton  MRN: [id number]  Birthdate 1940  Date of evaluation: 7/22/2025  Provider: Albert Last Jr PA  PCP: No primary care provider on file.  Note Started: 6:13 PM EDT 7/22/25       I have seen and evaluated this patient with my supervising physician Sandhya Solomon MD.      CHIEF COMPLAINT       Chief Complaint   Patient presents with    Chest Pain     Pain in R lower chest that worsens with cough. Denies pain radiating. Reports SOB and cough with no production. Seen by PCP today and CT chest completed at [facility]. Called by RN and informed to come to ED for blood clots in lung, pneumonia, and pleurisy results.        HISTORY OF PRESENT ILLNESS: 1 or more Elements     History from : Patient    Limitations to history : None    Radha Tipton is a 85 y.o. female who presents with reports of left-sided chest pain and shortness of breath it has been going on for around 4 days.  States that she saw her primary care today and had lab work as well as CT imaging done that showed that she had blood clots in her lungs and was told to come to the emergency department.  She does report a prior history of breast cancer but is 5 years into remission.  She also reports a prior history of a blood clot in her leg that was a long time ago.  She cannot remember what blood thinning medication she was on.  Denies any hemoptysis or recent travel.  She has no other complaints at this time.  Review of systems otherwise negative.    Nursing Notes were all reviewed and agreed with or any disagreements were addressed in the HPI.      SURGICAL HISTORY     Past Surgical History:   Procedure Laterality Date    ABDOMINOPLASTY      CATARACT REMOVAL Right 2-2015    JOINT REPLACEMENT Left 08/03/15    left total hip arthroplasty, anterior approach    TOTAL HIP ARTHROPLASTY      almost a year ago per PT    UPPER GASTROINTESTINAL ENDOSCOPY  5/2012

## 2025-07-23 ENCOUNTER — APPOINTMENT (OUTPATIENT)
Age: 85
End: 2025-07-23
Attending: INTERNAL MEDICINE
Payer: MEDICARE

## 2025-07-23 ENCOUNTER — APPOINTMENT (OUTPATIENT)
Dept: VASCULAR LAB | Age: 85
End: 2025-07-23
Attending: INTERNAL MEDICINE
Payer: MEDICARE

## 2025-07-23 PROBLEM — I26.02 ACUTE SADDLE PULMONARY EMBOLISM WITH ACUTE COR PULMONALE (HCC): Status: ACTIVE | Noted: 2025-07-22

## 2025-07-23 LAB
ANION GAP SERPL CALCULATED.3IONS-SCNC: 10 MMOL/L (ref 3–16)
ANTI-XA UNFRAC HEPARIN: 0.44 IU/ML (ref 0.3–0.7)
ANTI-XA UNFRAC HEPARIN: 1.06 IU/ML (ref 0.3–0.7)
ANTI-XA UNFRAC HEPARIN: >1.1 IU/ML (ref 0.3–0.7)
ANTI-XA UNFRAC HEPARIN: >1.1 IU/ML (ref 0.3–0.7)
BASOPHILS # BLD: 0 K/UL (ref 0–0.2)
BASOPHILS NFR BLD: 0.3 %
BUN SERPL-MCNC: 20 MG/DL (ref 7–20)
CALCIUM SERPL-MCNC: 8.1 MG/DL (ref 8.3–10.6)
CHLORIDE SERPL-SCNC: 107 MMOL/L (ref 99–110)
CO2 SERPL-SCNC: 24 MMOL/L (ref 21–32)
CREAT SERPL-MCNC: 1.3 MG/DL (ref 0.6–1.2)
DEPRECATED RDW RBC AUTO: 12.7 % (ref 12.4–15.4)
ECHO AO ASC DIAM: 3.6 CM
ECHO AO ASCENDING AORTA INDEX: 2 CM/M2
ECHO AO ROOT DIAM: 3.2 CM
ECHO AO ROOT INDEX: 1.78 CM/M2
ECHO AV AREA PEAK VELOCITY: 2.9 CM2
ECHO AV AREA/BSA PEAK VELOCITY: 1.6 CM2/M2
ECHO AV CUSP MM: 1.8 CM
ECHO AV PEAK GRADIENT: 6 MMHG
ECHO AV PEAK VELOCITY: 1.3 M/S
ECHO AV VELOCITY RATIO: 0.77
ECHO BSA: 1.84 M2
ECHO BSA: 1.84 M2
ECHO EST RA PRESSURE: 8 MMHG
ECHO IVC EXP: 2.5 CM
ECHO IVC INSP: 0.9 CM
ECHO LA AREA 2C: 12.8 CM2
ECHO LA AREA 4C: 13.8 CM2
ECHO LA DIAMETER INDEX: 1.56 CM/M2
ECHO LA DIAMETER: 2.8 CM
ECHO LA MAJOR AXIS: 4.9 CM
ECHO LA MINOR AXIS: 4.7 CM
ECHO LA TO AORTIC ROOT RATIO: 0.88
ECHO LA VOL BP: 30 ML (ref 22–52)
ECHO LA VOL MOD A2C: 29 ML (ref 22–52)
ECHO LA VOL MOD A4C: 31 ML (ref 22–52)
ECHO LA VOL/BSA BIPLANE: 17 ML/M2 (ref 16–34)
ECHO LA VOLUME INDEX MOD A2C: 16 ML/M2 (ref 16–34)
ECHO LA VOLUME INDEX MOD A4C: 17 ML/M2 (ref 16–34)
ECHO LV E' LATERAL VELOCITY: 8.7 CM/S
ECHO LV E' SEPTAL VELOCITY: 7.51 CM/S
ECHO LV EDV A2C: 107 ML
ECHO LV EDV A4C: 85 ML
ECHO LV EDV INDEX A4C: 47 ML/M2
ECHO LV EDV NDEX A2C: 59 ML/M2
ECHO LV EF PHYSICIAN: 50 %
ECHO LV EJECTION FRACTION A2C: 55 %
ECHO LV EJECTION FRACTION A4C: 50 %
ECHO LV EJECTION FRACTION BIPLANE: 50 % (ref 55–100)
ECHO LV ESV A2C: 48 ML
ECHO LV ESV A4C: 43 ML
ECHO LV ESV INDEX A2C: 27 ML/M2
ECHO LV ESV INDEX A4C: 24 ML/M2
ECHO LV FRACTIONAL SHORTENING: 18 % (ref 28–44)
ECHO LV INTERNAL DIMENSION DIASTOLE INDEX: 2.5 CM/M2
ECHO LV INTERNAL DIMENSION DIASTOLIC: 4.5 CM (ref 3.9–5.3)
ECHO LV INTERNAL DIMENSION SYSTOLIC INDEX: 2.06 CM/M2
ECHO LV INTERNAL DIMENSION SYSTOLIC: 3.7 CM
ECHO LV ISOVOLUMETRIC RELAXATION TIME (IVRT): 92 MS
ECHO LV IVSD: 0.6 CM (ref 0.6–0.9)
ECHO LV MASS 2D: 78.9 G (ref 67–162)
ECHO LV MASS INDEX 2D: 43.8 G/M2 (ref 43–95)
ECHO LV POSTERIOR WALL DIASTOLIC: 0.6 CM (ref 0.6–0.9)
ECHO LV RELATIVE WALL THICKNESS RATIO: 0.27
ECHO LVOT AREA: 3.8 CM2
ECHO LVOT DIAM: 2.2 CM
ECHO LVOT MEAN GRADIENT: 2 MMHG
ECHO LVOT PEAK GRADIENT: 4 MMHG
ECHO LVOT PEAK VELOCITY: 1 M/S
ECHO LVOT STROKE VOLUME INDEX: 40.3 ML/M2
ECHO LVOT SV: 72.6 ML
ECHO LVOT VTI: 19.1 CM
ECHO MV "A" WAVE DURATION: 166 MSEC
ECHO MV A VELOCITY: 0.88 M/S
ECHO MV E DECELERATION TIME (DT): 198 MS
ECHO MV E VELOCITY: 0.79 M/S
ECHO MV E/A RATIO: 0.9
ECHO MV E/E' LATERAL: 9.08
ECHO MV E/E' RATIO (AVERAGED): 9.8
ECHO MV E/E' SEPTAL: 10.52
ECHO PV ACCELERATION TIME (AT): 24 MS
ECHO RA AREA 4C: 19 CM2
ECHO RA END SYSTOLIC VOLUME APICAL 4 CHAMBER INDEX BSA: 28 ML/M2
ECHO RA VOLUME: 51 ML
ECHO RIGHT VENTRICULAR SYSTOLIC PRESSURE (RVSP): 36 MMHG
ECHO RV AREA DIASTOLE: 25.2 CM2
ECHO RV AREA SYSTOLE: 15.3 CM2
ECHO RV BASAL DIMENSION: 4.3 CM
ECHO RV END DIASTOLIC AREA INDEX: 14 CM2/M2
ECHO RV END SYSTOLIC AREA INDEX: 8.5 CM2/M2
ECHO RV FRACTIONAL AREA CHANGE: 39 %
ECHO RV FREE WALL PEAK S': 10.6 CM/S
ECHO RV LONGITUDINAL DIMENSION: 7.5 CM
ECHO RV MID DIMENSION: 3.5 CM
ECHO RV TAPSE: 2 CM (ref 1.7–?)
ECHO TV REGURGITANT MAX VELOCITY: 2.65 M/S
ECHO TV REGURGITANT PEAK GRADIENT: 28 MMHG
EOSINOPHIL # BLD: 0.2 K/UL (ref 0–0.6)
EOSINOPHIL NFR BLD: 2.7 %
GFR SERPLBLD CREATININE-BSD FMLA CKD-EPI: 41 ML/MIN/{1.73_M2}
GLUCOSE SERPL-MCNC: 115 MG/DL (ref 70–99)
HCT VFR BLD AUTO: 32 % (ref 36–48)
HGB BLD-MCNC: 11.2 G/DL (ref 12–16)
LYMPHOCYTES # BLD: 1.4 K/UL (ref 1–5.1)
LYMPHOCYTES NFR BLD: 17.2 %
MAGNESIUM SERPL-MCNC: 1.52 MG/DL (ref 1.8–2.4)
MCH RBC QN AUTO: 31.9 PG (ref 26–34)
MCHC RBC AUTO-ENTMCNC: 35.2 G/DL (ref 31–36)
MCV RBC AUTO: 90.8 FL (ref 80–100)
MONOCYTES # BLD: 1.1 K/UL (ref 0–1.3)
MONOCYTES NFR BLD: 13.5 %
NEUTROPHILS # BLD: 5.4 K/UL (ref 1.7–7.7)
NEUTROPHILS NFR BLD: 66.3 %
PLATELET # BLD AUTO: 112 K/UL (ref 135–450)
PMV BLD AUTO: 8.7 FL (ref 5–10.5)
POTASSIUM SERPL-SCNC: 3.1 MMOL/L (ref 3.5–5.1)
RBC # BLD AUTO: 3.52 M/UL (ref 4–5.2)
SODIUM SERPL-SCNC: 141 MMOL/L (ref 136–145)
WBC # BLD AUTO: 8.2 K/UL (ref 4–11)

## 2025-07-23 PROCEDURE — 85025 COMPLETE CBC W/AUTO DIFF WBC: CPT

## 2025-07-23 PROCEDURE — 93970 EXTREMITY STUDY: CPT

## 2025-07-23 PROCEDURE — C8929 TTE W OR WO FOL WCON,DOPPLER: HCPCS

## 2025-07-23 PROCEDURE — 6370000000 HC RX 637 (ALT 250 FOR IP): Performed by: INTERNAL MEDICINE

## 2025-07-23 PROCEDURE — 83735 ASSAY OF MAGNESIUM: CPT

## 2025-07-23 PROCEDURE — 36415 COLL VENOUS BLD VENIPUNCTURE: CPT

## 2025-07-23 PROCEDURE — 6370000000 HC RX 637 (ALT 250 FOR IP): Performed by: NURSE PRACTITIONER

## 2025-07-23 PROCEDURE — 93970 EXTREMITY STUDY: CPT | Performed by: SURGERY

## 2025-07-23 PROCEDURE — 6360000002 HC RX W HCPCS: Performed by: INTERNAL MEDICINE

## 2025-07-23 PROCEDURE — 99223 1ST HOSP IP/OBS HIGH 75: CPT | Performed by: CLINICAL NURSE SPECIALIST

## 2025-07-23 PROCEDURE — 93306 TTE W/DOPPLER COMPLETE: CPT | Performed by: INTERNAL MEDICINE

## 2025-07-23 PROCEDURE — 2580000003 HC RX 258: Performed by: INTERNAL MEDICINE

## 2025-07-23 PROCEDURE — 80048 BASIC METABOLIC PNL TOTAL CA: CPT

## 2025-07-23 PROCEDURE — 6360000004 HC RX CONTRAST MEDICATION: Performed by: INTERNAL MEDICINE

## 2025-07-23 PROCEDURE — 2060000000 HC ICU INTERMEDIATE R&B

## 2025-07-23 PROCEDURE — 85520 HEPARIN ASSAY: CPT

## 2025-07-23 PROCEDURE — 2500000003 HC RX 250 WO HCPCS: Performed by: INTERNAL MEDICINE

## 2025-07-23 RX ORDER — MECOBALAMIN 5000 MCG
10 TABLET,DISINTEGRATING ORAL NIGHTLY
Status: DISCONTINUED | OUTPATIENT
Start: 2025-07-23 | End: 2025-07-25 | Stop reason: HOSPADM

## 2025-07-23 RX ADMIN — Medication 10 MG: at 23:21

## 2025-07-23 RX ADMIN — AZITHROMYCIN 500 MG: 250 TABLET, FILM COATED ORAL at 16:30

## 2025-07-23 RX ADMIN — SODIUM CHLORIDE: 0.9 INJECTION, SOLUTION INTRAVENOUS at 07:27

## 2025-07-23 RX ADMIN — HEPARIN SODIUM AND DEXTROSE 15 UNITS/KG/HR: 10000; 5 INJECTION INTRAVENOUS at 03:17

## 2025-07-23 RX ADMIN — POTASSIUM CHLORIDE 40 MEQ: 1500 TABLET, EXTENDED RELEASE ORAL at 05:42

## 2025-07-23 RX ADMIN — WATER 1000 MG: 1 INJECTION INTRAMUSCULAR; INTRAVENOUS; SUBCUTANEOUS at 16:29

## 2025-07-23 RX ADMIN — PANTOPRAZOLE SODIUM 20 MG: 20 TABLET, DELAYED RELEASE ORAL at 05:42

## 2025-07-23 RX ADMIN — MAGNESIUM SULFATE HEPTAHYDRATE 2000 MG: 40 INJECTION, SOLUTION INTRAVENOUS at 05:42

## 2025-07-23 RX ADMIN — SULFUR HEXAFLUORIDE 2 ML: KIT at 09:33

## 2025-07-23 RX ADMIN — TRAZODONE HYDROCHLORIDE 50 MG: 50 TABLET ORAL at 20:16

## 2025-07-23 ASSESSMENT — PAIN SCALES - GENERAL: PAINLEVEL_OUTOF10: 2

## 2025-07-23 NOTE — CONSULTS
[facility]           Vascular Surgery Consultation  Radha Tipton  Medical Record #: [id number]  YOB: 1940      Date of Admission:  7/22/2025  5:20 PM  Date of Consultation:  7/23/2025      PCP:  Galileo Barrow III, MD       Chief Complaint:  Chest pain and shortness of breath    History of Present Illness:   We are asked to see this patient in consultation by KENNEY Hadley MD  regarding saddle PE.    Radha Tipton is a 85 y.o. female who presented to PCP office yesterday with pleuritic chest pain and dyspnea. At the office, she was found to be tachycardic with elevated temp to 100.7. She was prescribed levofloxacin and scheduled for an outpatient D-dimer and CT PE scan. CT scan showed acute bilateral PE with borderline RV strain. She was instructed by her PCP to go the Emergency Room. In the ED, a bedside POCUS suggested borderline RV strain. PERT was called and recommended initiation of IV anticoagulation, NPO after midnight and admission. A lower extremity venous duplex was obtained this AM showing left common femoral vein, femoral vein, popliteal vein, gastroc vein, and PT vein thrombosis. Vascular surgery was consulted.       Past Medical History:   Diagnosis Date    Fx ankle     X 15 yrs ago per PT    GERD (gastroesophageal reflux disease)     sees [facility], had one biopsy which shared [facility]'s, gets EGD surveillance q3 years    Hyperlipidemia     Hypertension     Joint pain    Depression  Metastatic left breast cancer to left axillary node 12/2019 tx with surgery (complicated by LUE lymphedema) and chemo (complicated by neuropathy)   H/o right lower extremity DVT 2020 - non-compliant with rivaroxaban x 3 months  Meniere's disease    Past Surgical History:   Procedure Laterality Date    ABDOMINOPLASTY      CATARACT REMOVAL Right 2-2015    JOINT REPLACEMENT Left 08/03/15    left total hip arthroplasty, anterior approach    TOTAL HIP ARTHROPLASTY

## 2025-07-23 NOTE — PROGRESS NOTES
4 Eyes Skin Assessment     NAME:  Radha Tipton  YOB: 1940  MEDICAL RECORD NUMBER:  [id number]    The patient is being assessed for  Admission    I agree that at least one RN has performed a thorough Head to Toe Skin Assessment on the patient. ALL assessment sites listed below have been assessed.      Areas assessed by both nurses:    Head, Face, Ears, Shoulders, Back, Chest, Arms, Elbows, Hands, Sacrum. Buttock, Coccyx, Ischium, Legs. Feet and Heels, and Under Medical Devices         Does the Patient have a Wound? No noted wound(s)       Joseph Prevention initiated by RN: No  Wound Care Orders initiated by RN: No    Pressure Injury (Stage 3,4, Unstageable, DTI, NWPT, and Complex wounds) if present, place Wound referral order by RN under : No    New Ostomies, if present place, Ostomy referral order under : No     Nurse 1 eSignature: Electronically signed by Silverio Fuentes RN on 7/22/25 at 9:48 PM EDT    **SHARE this note so that the co-signing nurse can place an eSignature**    Nurse 2 eSignature: Electronically signed by Magdiel Bain RN on 7/22/25 at 11:48 PM EDT

## 2025-07-23 NOTE — PROGRESS NOTES
Occupational/Physical Therapy  OT/PT orders received. Will hold at this time 2/2 acute bilateral PEs. Will wait for doppler results and vascular consult.     Ifrah Lou, OTR/L  Maryse Bailon, PT, DPT

## 2025-07-23 NOTE — ED NOTES
Radha Tipton is a 85 y.o. female admitted for  Principal Problem:    Acute saddle pulmonary embolism, unspecified whether acute cor pulmonale present (HCC)  Resolved Problems:    * No resolved hospital problems. *  .   Patient Home via family with   Chief Complaint   Patient presents with    Chest Pain     Pain in R lower chest that worsens with cough. Denies pain radiating. Reports SOB and cough with no production. Seen by PCP today and CT chest completed at [facility]. Called by RN and informed to come to ED for blood clots in lung, pneumonia, and pleurisy results.    .  Patient is alert and Person, Place, Time, and Situation  Patient's baseline mobility: Baseline Mobility: Independent   Code Status: Prior   Cardiac Rhythm:       Is patient on baseline Oxygen: no how many Liters  Abnormal Assessment Findings: SOB    Isolation:       NIH Score:    C-SSRS: Risk of Suicide: No Risk  Bedside swallow:        Active LDA's:   Peripheral IV 07/22/25 Right Antecubital (Active)   Site Assessment Clean, dry & intact 07/22/25 1733   Line Status Blood return noted;Flushed;Normal saline locked;Specimen collected 07/22/25 1733   Dressing Status New dressing applied 07/22/25 1733       Peripheral IV 07/22/25 Left Antecubital (Active)         Family/Caregiver Present yes Any Concerns: no   Restraints no  Sitter no         Vitals: MEWS Score: 2    Vitals:    07/22/25 1729 07/22/25 1828   BP: 126/67 (!) 149/76   Pulse: 100 93   Resp: 22 24   Temp: (!) 100.7 °F (38.2 °C)    TempSrc: Oral    SpO2: 95% 94%   Weight: 75.8 kg (167 lb 3.2 oz)    Height: 1.6 m (5' 3\")        Last documented pain score (0-10 scale)    Pain medication administered no    Pertinent or High Risk Medications/Drips: Yes- see MAR.    Pending Blood Product Administration: no    Abnormal labs:   Abnormal Labs Reviewed   CBC WITH AUTO DIFFERENTIAL - Abnormal; Notable for the following components:       Result Value    WBC 11.4 (*)     Neutrophils Absolute 9.0 (*)

## 2025-07-23 NOTE — H&P
Hospital Medicine History and Physical      Chief Complaint:  dyspnea, pleuritic pains      History and Present Illness:  The patient is a pleasant 85 Y F with a h/o HTN, HLD, and L breast cancer metastatic to a L axillary node in 2019 s/p resection (complicated by LUE lymphedema) and chemo (complicated by neuropathy).  She declined radiation, follows with [facility] oncology.  She also had a RLE DVT diagnosed in 2020 - her physicians documented that she was treated with rivaroxaban for three months but the patient now admits that she never took the medication due to fear of cutting herself and bleeding at home alone.   She presented to her PCP today with dyspnea and pleuritic chest pains.  At the office visit she had tachycardia to 111, temp of 100.7.  She was saturating 95% on RA.  The PCP prescribed levofloxacin, ordered a ddimer (returned at 46x the ULN), and arranged for an outpatient CTPA that same day.  CTPA ended up showing acute bilateral PE's in all 5 lobes, including a saddle PE involving the main pulmonary arteries.  There was some borderline RV strain.  The PCP called the patient and directed her to the ED.  In the ED a POCUS also suggested borderline RV strain.  She had some mild tachycardia and fever but wasn't hypotensive and saturated well on RA.  Vascular surgery was consulted and recommended making her NPO after midnight.  Hospital medicine was called for admission.         General appearance: No apparent distress, appears stated age and cooperative.  HEENT: Pupils equal, round.  Conjunctivae/corneas clear.  Neck: No jugular venous distention.   Respiratory:  Normal respiratory effort.  Bilaterally without Rales/Wheezes/Rhonchi.  Cardiovascular: tachycardic rate and regular rhythm with normal S1/S2 without murmurs, rubs or gallops.  Abdomen: Soft, non-tender, non-distended with normal bowel sounds.  Musculoskeletal: No cyanosis bilaterally.  LUE lymphedema.  Skin: No jaundice.  No rashes or

## 2025-07-23 NOTE — PROGRESS NOTES
Patient admitted to room 210 from ED.  Patient oriented to room, call light, bed rails, phone, lights and bathroom.  Patient instructed about the schedule of the day including: vital sign frequency, lab draws, possible tests, frequency of MD and staff rounds, including RN/MD rounding together at bedside, daily weights, and I &O's.  Patient instructed about prescribed diet, how to use 8MENU, and television.   bed alarm in place, patient aware of placement and reason.   Telemetry box  in place, patient aware of placement and reason.  Bed locked, in lowest position, side rails up 2/4, call light within reach.  Will continue to monitor.

## 2025-07-23 NOTE — CARE COORDINATION
Case Management Assessment  Initial Evaluation    Date/Time of Evaluation: 7/23/2025 10:07 AM  Assessment Completed by: Mehnaz Pfeiffer RN    If patient is discharged prior to next notation, then this note serves as note for discharge by case management.    Patient Name: Radha Tipton                   YOB: 1940  Diagnosis: Pulmonary embolus and infarction (HCC) [I26.99]  SIRS (systemic inflammatory response syndrome) (HCC) [R65.10]  TJ (acute kidney injury) [N17.9]  Acute saddle pulmonary embolism with acute cor pulmonale (HCC) [I26.02]  Septic pulmonary embolism with acute cor pulmonale, unspecified chronicity (HCC) [I26.01]  Other forms of dyspnea [R06.09]  Acute saddle pulmonary embolism, unspecified whether acute cor pulmonale present (HCC) [I26.92]                   Date / Time: 7/22/2025  5:20 PM    Patient Admission Status: Inpatient   Readmission Risk (Low < 19, Mod (19-27), High > 27): Readmission Risk Score: 10.1    Current PCP: Galileo Barrow III, MD  PCP verified by CM? Yes    Chart Reviewed: Yes      History Provided by: Patient  Patient Orientation: Alert and Oriented, Person, Place, Situation    Patient Cognition: Alert    Hospitalization in the last 30 days (Readmission):  No    If yes, Readmission Assessment in  Navigator will be completed.    Advance Directives:    Code Status: Full Code   Patient's Primary Decision Maker is: Legal Next of Kin    Primary Decision Maker: Ev Barroso - Child - [phone]    Discharge Planning:  Patient lives with: Alone Type of Home: House  Primary Care Giver: Self  Patient Support Systems include: Children   Current Financial resources: Medicare  Current community resources: None  Current services prior to admission: None            Current DME: none used             Type of Home Care services:  None    ADLS  Prior functional level: Independent in ADLs/IADLs  Current functional level: Assistance with the following:, Mobility,

## 2025-07-23 NOTE — PROGRESS NOTES
V2.0    [facility] [place]      Name:  Radha Tipton /Age/Sex: 1940  (85 y.o. female)   MRN & CSN:  [id number] & [id number] Encounter Date/Time: 2025 11:58 AM EDT   Location:   PCP: Galileo Barrow III, MD     Attending:Roel Hadley MD       Hospital Day: 2    Assessment and Recommendations   Radha Tipton is a 85 y.o. female with pmh of HTN, HLD, and L breast cancer metastatic to a L axillary node in 2019 s/p resection (complicated by LUE lymphedema) and chemo (complicated by neuropathy)  who presents with Acute saddle pulmonary embolism, unspecified whether acute cor pulmonale present (HCC)        # Bilateral PE's, including saddle PE: Presented with chest pain, dyspnea, CTPA showing acute bilateral PEs in all 5 lobes, including saddle PE involving the main pulmonary arteries, started on heparin drip, ordered TTE and BLE venous doppler.  Vascular consulted.    Due to large clot burden and second episode of DVT/PE, would recommend indefinite anticoagulation, so hypercoag workup would be unlikely to .  Ongoing f/u with [facility] oncology regarding her cancer surveillance.      # Suspected lingular infarct on CT.  Difficult to confidently r/o pneumonia given her fever, tachycardia, mild leukocytosis with 14% bandemia, and pleurisy.  Continued empiric ceftriaxone and doxycycline from the ED.  Negative for COVID with PCP.     # TJ.  Baseline Cr was about 1 in 10/2024.  Monitor response to IVFs.     # HTN.  Held her HCTZ+triamterene due to TJ.    Medical Decision Making:  The following items were considered in medical decision making:  Discussion of patient care with other providers  Reviewed clinical lab tests if any  Reviewed radiology tests if any  Reviewed other diagnostic tests/interventions  Independent review of radiologic images if any  Microbiology cultures and other micro tests if any    Estimated time spent for medical decision-making  Sips of Water with Meds   DVT Prophylaxis [] Lovenox, [x]  Heparin, [] SCDs, [] Ambulation,  [] Eliquis, [] Xarelto  [] Coumadin   Code Status Full Code   Disposition From: Home  Expected Disposition: Home versus SNF  Estimated Date of Discharge: TBD  Patient requires continued admission due to PE   Surrogate Decision Maker/ POA Daughter     Personally reviewed Lab Studies and Imaging     EKG interpreted personally and results sinus tachycardia    Imaging that was interpreted personally includes CT and results PE    Drugs that require monitoring for toxicity include heparin and the method of monitoring was CBC and APTT        Subjective:     Chief Complaint:   Chief Complaint   Patient presents with    Chest Pain     Pain in R lower chest that worsens with cough. Denies pain radiating. Reports SOB and cough with no production. Seen by PCP today and CT chest completed at [facility]. Called by RN and informed to come to ED for blood clots in lung, pneumonia, and pleurisy results.        Patient seen and examined at bedside patient states she still feels short of breath, chest pain and breathing better now denies any pain in the legs or swelling denies any other complaints at this time      Review of Systems:      Pertinent positives and negatives discussed in HPI    Objective:     Intake/Output Summary (Last 24 hours) at 7/23/2025 1158  Last data filed at 7/23/2025 1035  Gross per 24 hour   Intake 240 ml   Output 925 ml   Net -685 ml      Vitals:   Vitals:    07/22/25 2315 07/23/25 0537 07/23/25 0736 07/23/25 0830   BP: 103/62 121/60 (!) 109/57    Pulse: 89 77 77 77   Resp: 18 18 18    Temp: 99.1 °F (37.3 °C) 98.2 °F (36.8 °C) 98.4 °F (36.9 °C)    TempSrc: Oral Oral Oral    SpO2: 94% 96% 94%    Weight:       Height:             Physical Exam:      General: Afebrile, no distress  Eyes: EOMI  ENT: neck supple  Cardiovascular: S1-S2 normal tachycardic  Respiratory: Clear to auscultation  Gastrointestinal: Soft, non

## 2025-07-24 LAB
ANION GAP SERPL CALCULATED.3IONS-SCNC: 11 MMOL/L (ref 3–16)
ANTI-XA UNFRAC HEPARIN: 0.4 IU/ML (ref 0.3–0.7)
BASOPHILS # BLD: 0 K/UL (ref 0–0.2)
BASOPHILS NFR BLD: 0.6 %
BUN SERPL-MCNC: 13 MG/DL (ref 7–20)
CALCIUM SERPL-MCNC: 8.5 MG/DL (ref 8.3–10.6)
CHLORIDE SERPL-SCNC: 107 MMOL/L (ref 99–110)
CO2 SERPL-SCNC: 23 MMOL/L (ref 21–32)
CREAT SERPL-MCNC: 1 MG/DL (ref 0.6–1.2)
DEPRECATED RDW RBC AUTO: 12.9 % (ref 12.4–15.4)
EOSINOPHIL # BLD: 0.3 K/UL (ref 0–0.6)
EOSINOPHIL NFR BLD: 4.8 %
GFR SERPLBLD CREATININE-BSD FMLA CKD-EPI: 56 ML/MIN/{1.73_M2}
GLUCOSE SERPL-MCNC: 98 MG/DL (ref 70–99)
HCT VFR BLD AUTO: 31.9 % (ref 36–48)
HGB BLD-MCNC: 11.1 G/DL (ref 12–16)
LYMPHOCYTES # BLD: 1.4 K/UL (ref 1–5.1)
LYMPHOCYTES NFR BLD: 22.1 %
MAGNESIUM SERPL-MCNC: 1.85 MG/DL (ref 1.8–2.4)
MCH RBC QN AUTO: 31.5 PG (ref 26–34)
MCHC RBC AUTO-ENTMCNC: 34.8 G/DL (ref 31–36)
MCV RBC AUTO: 90.5 FL (ref 80–100)
MONOCYTES # BLD: 0.7 K/UL (ref 0–1.3)
MONOCYTES NFR BLD: 11.2 %
NEUTROPHILS # BLD: 4 K/UL (ref 1.7–7.7)
NEUTROPHILS NFR BLD: 61.3 %
PLATELET # BLD AUTO: 127 K/UL (ref 135–450)
PMV BLD AUTO: 8.6 FL (ref 5–10.5)
POTASSIUM SERPL-SCNC: 3.6 MMOL/L (ref 3.5–5.1)
RBC # BLD AUTO: 3.52 M/UL (ref 4–5.2)
SODIUM SERPL-SCNC: 141 MMOL/L (ref 136–145)
WBC # BLD AUTO: 6.5 K/UL (ref 4–11)

## 2025-07-24 PROCEDURE — 80048 BASIC METABOLIC PNL TOTAL CA: CPT

## 2025-07-24 PROCEDURE — 85025 COMPLETE CBC W/AUTO DIFF WBC: CPT

## 2025-07-24 PROCEDURE — 97162 PT EVAL MOD COMPLEX 30 MIN: CPT

## 2025-07-24 PROCEDURE — 97535 SELF CARE MNGMENT TRAINING: CPT

## 2025-07-24 PROCEDURE — 97530 THERAPEUTIC ACTIVITIES: CPT

## 2025-07-24 PROCEDURE — 97116 GAIT TRAINING THERAPY: CPT

## 2025-07-24 PROCEDURE — 2060000000 HC ICU INTERMEDIATE R&B

## 2025-07-24 PROCEDURE — 6370000000 HC RX 637 (ALT 250 FOR IP): Performed by: INTERNAL MEDICINE

## 2025-07-24 PROCEDURE — 97166 OT EVAL MOD COMPLEX 45 MIN: CPT

## 2025-07-24 PROCEDURE — 2500000003 HC RX 250 WO HCPCS: Performed by: INTERNAL MEDICINE

## 2025-07-24 PROCEDURE — 83735 ASSAY OF MAGNESIUM: CPT

## 2025-07-24 PROCEDURE — 36415 COLL VENOUS BLD VENIPUNCTURE: CPT

## 2025-07-24 PROCEDURE — 85520 HEPARIN ASSAY: CPT

## 2025-07-24 PROCEDURE — 6370000000 HC RX 637 (ALT 250 FOR IP): Performed by: NURSE PRACTITIONER

## 2025-07-24 PROCEDURE — 6360000002 HC RX W HCPCS: Performed by: INTERNAL MEDICINE

## 2025-07-24 RX ADMIN — WATER 1000 MG: 1 INJECTION INTRAMUSCULAR; INTRAVENOUS; SUBCUTANEOUS at 18:19

## 2025-07-24 RX ADMIN — Medication 10 MG: at 21:05

## 2025-07-24 RX ADMIN — AZITHROMYCIN 500 MG: 250 TABLET, FILM COATED ORAL at 18:19

## 2025-07-24 RX ADMIN — SODIUM CHLORIDE, PRESERVATIVE FREE 10 ML: 5 INJECTION INTRAVENOUS at 21:06

## 2025-07-24 RX ADMIN — RIVAROXABAN 15 MG: 15 TABLET, FILM COATED ORAL at 12:53

## 2025-07-24 RX ADMIN — RIVAROXABAN 15 MG: 15 TABLET, FILM COATED ORAL at 18:20

## 2025-07-24 NOTE — PROGRESS NOTES
Occupational Therapy  Facility/Department: [facility] A2 CARD TELEMETRY  Occupational Therapy Initial Assessment & Treatment    Name: Radha Tipton  : 1940  MRN: [id number]  Date of Service: 2025    Discharge Recommendations:  Subacute/Skilled Nursing Facility  Pt requesting SNF d/c for more therapy to increase confidence and safety. She is concerned about being to weak to do flight of stairs and take care of herself/house with d/c home. Does not want HHOT and daughter not available to help (works full time).   OT Equipment Recommendations  Equipment Needed:  (CTA)    AM-PAC score  AM-PAC Inpatient Daily Activity Raw Score: 20 (25)  AM-PAC Inpatient ADL T-Scale Score : 42.03 (25)  ADL Inpatient CMS 0-100% Score: 38.32 (25)  ADL Inpatient CMS G-Code Modifier : CJ (25)    Therapy discharge recommendations take into account each patient's current medical complexities and are subject to input/oversight from the patient's healthcare team.   Barriers to Home Discharge:   [] Steps to access home entry or bed/bath:   [] Unable to transfer, ambulate, or propel wheelchair household distances without assist   [x] Limited available assist at home upon discharge    [x] Patient or family requests d/c to post-acute facility    [] Poor cognition/safety awareness for d/c to home alone    []Unable to maintain ordered weight bearing status    [] Patient with salient signs of long-standing immobility   [x] Patient is at risk for falls   [x] Other: Decreased safety and independence w/ ADLs.     If pt is unable to be seen after this session, please let this note serve as discharge summary.  Please see case management note for discharge disposition.  Thank you.    Patient Diagnosis(es): The primary encounter diagnosis was Acute saddle pulmonary embolism with acute cor pulmonale (HCC). Diagnoses of SIRS (systemic inflammatory response syndrome) (HCC), TJ (acute kidney injury), Other

## 2025-07-24 NOTE — CARE COORDINATION
Chart review day 2- from home alone, IPTA, Acute PE & LLE DVT; possible home care at [facility]. CM will continue to follow.

## 2025-07-24 NOTE — PROGRESS NOTES
Hospital Medicine Progress Note  V 5.17      Date of Admission: 7/22/2025    Hospital Day: 3      Chief Admission Complaint:  dyspnea, pleuritic pains - left     Subjective:   no pain or sob     Presenting Admission History:       The patient is a pleasant 85 Y F with a h/o HTN, HLD, and L breast cancer metastatic to a L axillary node in 2019 s/p resection (complicated by LUE lymphedema) and chemo (complicated by neuropathy).  She declined radiation, follows with [facility] oncology.  She also had a RLE DVT diagnosed in 2020 - her physicians documented that she was treated with rivaroxaban for three months but the patient now admits that she never took the medication due to fear of cutting herself and bleeding at home alone.              She presented to her PCP today with dyspnea and pleuritic chest pains.  At the office visit she had tachycardia to 111, temp of 100.7.  She was saturating 95% on RA.  The PCP prescribed levofloxacin, ordered a ddimer (returned at 46x the ULN), and arranged for an outpatient CTPA that same day.  CTPA ended up showing acute bilateral PE's in all 5 lobes, including a saddle PE involving the main pulmonary arteries.  There was some borderline RV strain.  The PCP called the patient and directed her to the ED.  In the ED a POCUS also suggested borderline RV strain.  She had some mild tachycardia and fever but wasn't hypotensive and saturated well on RA.  Vascular surgery was consulted and recommended making her NPO after midnight.  Hospital medicine was called for admission.     Assessment/Plan:      Bilateral PE's, including saddle PE.  F/u TTE and BLE venous doppler- left DVT.  Vascular consulted.  Input appreciated , no  procedure  reccommended  Heparin gtt  switched to xarelto .  Due to large clot burden and second episode of DVT/PE, would recommend indefinite anticoagulation, so hypercoag workup would be unlikely to .  Ongoing f/u with [facility] oncology regarding her

## 2025-07-24 NOTE — PROGRESS NOTES
Physical Therapy  Facility/Department: [facility] A2 CARD TELEMETRY  Physical Therapy Initial Assessment    Name: Radha Tipton  : 1940  MRN: [id number]  Date of Service: 2025    Discharge Recommendations:  Subacute/Skilled Nursing Facility   PT Equipment Recommendations  Equipment Needed: No      Therapy discharge recommendations take into account each patient's current medical complexities and are subject to input/oversight from the patient's healthcare team.   Barriers to Home Discharge:   [x] Steps to access home entry or bed/bath: 3 DIONNA and 12 steps up to bedroom/bathroom   [x] Unable to transfer, ambulate, or propel wheelchair household distances without assist   [x] Limited available assist at home upon discharge    [x] Patient or family requests d/c to post-acute facility    [x] Poor cognition/safety awareness for d/c to home alone    []Unable to maintain ordered weight bearing status    [] Patient with salient signs of long-standing immobility   [x] Patient is at risk for falls   [] Other:    If pt is unable to be seen after this session, please let this note serve as discharge summary.  Please see case management note for discharge disposition.  Thank you.    Patient Diagnosis(es): The primary encounter diagnosis was Acute saddle pulmonary embolism with acute cor pulmonale (HCC). Diagnoses of SIRS (systemic inflammatory response syndrome) (HCC), TJ (acute kidney injury), Other forms of dyspnea, Septic pulmonary embolism with acute cor pulmonale, unspecified chronicity (HCC), and Pulmonary embolus and infarction (HCC) were also pertinent to this visit.  Past Medical History:  has a past medical history of Fx ankle, GERD (gastroesophageal reflux disease), Hyperlipidemia, Hypertension, and Joint pain.  Past Surgical History:  has a past surgical history that includes Abdominoplasty; Total hip arthroplasty; Upper gastrointestinal endoscopy (2012); Cataract removal (Right, -); and joint

## 2025-07-25 VITALS
OXYGEN SATURATION: 98 % | DIASTOLIC BLOOD PRESSURE: 73 MMHG | HEIGHT: 63 IN | TEMPERATURE: 98.2 F | SYSTOLIC BLOOD PRESSURE: 128 MMHG | WEIGHT: 167.9 LBS | HEART RATE: 79 BPM | BODY MASS INDEX: 29.75 KG/M2 | RESPIRATION RATE: 18 BRPM

## 2025-07-25 PROCEDURE — 6370000000 HC RX 637 (ALT 250 FOR IP): Performed by: INTERNAL MEDICINE

## 2025-07-25 PROCEDURE — 2500000003 HC RX 250 WO HCPCS: Performed by: INTERNAL MEDICINE

## 2025-07-25 RX ORDER — RIVAROXABAN 2.5 MG/1
TABLET, FILM COATED ORAL
Qty: 42 TABLET | Refills: 0 | Status: SHIPPED | OUTPATIENT
Start: 2025-07-25 | End: 2025-09-13

## 2025-07-25 RX ORDER — CEFUROXIME AXETIL 500 MG/1
500 TABLET ORAL 2 TIMES DAILY
Qty: 8 TABLET | Refills: 0 | Status: SHIPPED | OUTPATIENT
Start: 2025-07-25 | End: 2025-07-29

## 2025-07-25 RX ADMIN — RIVAROXABAN 15 MG: 15 TABLET, FILM COATED ORAL at 08:43

## 2025-07-25 RX ADMIN — SODIUM CHLORIDE, PRESERVATIVE FREE 10 ML: 5 INJECTION INTRAVENOUS at 08:44

## 2025-07-25 RX ADMIN — PANTOPRAZOLE SODIUM 20 MG: 20 TABLET, DELAYED RELEASE ORAL at 05:00

## 2025-07-25 NOTE — PLAN OF CARE
Problem: Discharge Planning  Goal: Discharge to home or other facility with appropriate resources  Outcome: Progressing     Problem: Safety - Adult  Goal: Free from fall injury  Outcome: Progressing     Problem: ABCDS Injury Assessment  Goal: Absence of physical injury  Outcome: Progressing     Problem: Pain  Goal: Verbalizes/displays adequate comfort level or baseline comfort level  Outcome: Progressing

## 2025-07-25 NOTE — PLAN OF CARE
Problem: Discharge Planning  Goal: Discharge to home or other facility with appropriate resources  7/25/2025 1002 by Heidi Collins RN  Outcome: Progressing  7/25/2025 0401 by Mary Upton RN  Outcome: Progressing     Problem: Safety - Adult  Goal: Free from fall injury  7/25/2025 1002 by Heidi Collins RN  Outcome: Progressing  7/25/2025 0401 by Mary Upton, RN  Outcome: Progressing     Problem: ABCDS Injury Assessment  Goal: Absence of physical injury  7/25/2025 1002 by Heidi Collins RN  Outcome: Progressing  7/25/2025 0401 by Mary Upton RN  Outcome: Progressing     Problem: Pain  Goal: Verbalizes/displays adequate comfort level or baseline comfort level  7/25/2025 1002 by Heidi Collins RN  Outcome: Progressing  7/25/2025 0401 by Mary Upton, RN  Outcome: Progressing  Flowsheets (Taken 7/24/2025 1555 by Addis Kerns, RN)  Verbalizes/displays adequate comfort level or baseline comfort level: Encourage patient to monitor pain and request assistance

## 2025-07-25 NOTE — DISCHARGE SUMMARY
Hospital Medicine Discharge Summary    Patient: Radha Tipton   : 1940     Hospital:  [facility]  Admit Date: 2025   Discharge Date:   ***  Disposition:  Home w/ HHC   Code status:  {Code Status:05834}  Condition at Discharge: {Discharge Condition:53449}  Primary Care Provider: Galileo Barrow III, MD    Admitting Provider: Riki Oglesby MD  Discharge Provider: Haim Reveles MD     Discharge Diagnoses:      Active Hospital Problems    Diagnosis     Acute saddle pulmonary embolism with acute cor pulmonale (HCC) [I26.02]        Presenting Admission History:      ***     Assessment/Plan:      Bilateral PE's, including saddle PE.  F/u TTE and BLE venous doppler- left DVT.  Vascular consulted.  Input appreciated , no  procedure  reccommended  Heparin gtt  switched to xarelto .  Due to large clot burden and second episode of DVT/PE, would recommend indefinite anticoagulation, so hypercoag workup would be unlikely to .  Ongoing f/u with [facility] oncology regarding her cancer surveillance.    Dc on xarelto     Suspected lingular infarct on CT.  Difficult to confidently r/o pneumonia given her fever, tachycardia, mild leukocytosis with 14% bandemia, and pleurisy.  Continued empiric ceftriaxone and doxycycline from the ED.  Negative for COVID with PCP.  Complete 4 days more of ceftin     TJ.  Baseline Cr was about 1 in 10/2024.  Monitor response to IVFs.- resolved      HTN.  Held her HCTZ+triamterene due to TJ.- BP WNL     Mild thrombocytopenia - monitor      Hypokalemia - replete , rpt WNL     Hypomagnesemia -  replete     Physical Exam Performed:      /83   Pulse 84   Temp 98.6 °F (37 °C) (Oral)   Resp 18   Ht 1.6 m (5' 3\")   Wt 76.2 kg (167 lb 14.4 oz)   SpO2 94%   BMI 29.74 kg/m²     General appearance:  No apparent distress, appears stated age and cooperative.  Respiratory:  Normal respiratory effort.   Cardiovascular:  Regular rate and

## 2025-07-25 NOTE — CARE COORDINATION
Follow-Up copy of Important Message from Medicare (IMM2) has been explained to patient and/or designated healthcare decision maker (HCDM). Pt and/or HCDM aware that patient is permitted to stay an additional 4 hours prior to discharge to consider an appeal if they feel as though they are being discharged too soon. Patient may discharge as planned if chooses to do so.  Patient/HCDM voice no other concerns or questions regarding this process. .Yudi Garcia RN

## 2025-07-25 NOTE — DISCHARGE INSTR - COC
Continuity of Care Form    Patient Name: Radha Tipton   :  1940  MRN:  [id number]    Admit date:  2025  Discharge date:  2025    Code Status Order: Full Code   Advance Directives:     Admitting Physician:  Riki Oglesby MD  PCP: Galileo Barrow III, MD    Discharging Nurse: TRACY Gordon   Discharging Hospital Unit/Room#: 0210/0210-01  Discharging Unit Phone Number: [phone]    Emergency Contact:   Extended Emergency Contact Information  Primary Emergency Contact: Ev Barroso  Home Phone: [phone]  Work Phone: [phone]  Relation: Child    Past Surgical History:  Past Surgical History:   Procedure Laterality Date    ABDOMINOPLASTY      CATARACT REMOVAL Right -    JOINT REPLACEMENT Left 08/03/15    left total hip arthroplasty, anterior approach    TOTAL HIP ARTHROPLASTY      almost a year ago per PT    UPPER GASTROINTESTINAL ENDOSCOPY  2012       Immunization History:   Immunization History   Administered Date(s) Administered    COVID-19, PFIZER PURPLE top, DILUTE for use, (age 12 y+), 30mcg/0.3mL 2021, 2021    Pneumococcal, PCV-13, PREVNAR 13, (age 6w+), IM, 0.5mL 2015    Pneumococcal, PPSV23, PNEUMOVAX 23, (age 2y+), SC/IM, 0.5mL 2016    TDaP, ADACEL (age 10y-64y), BOOSTRIX (age 10y+), IM, 0.5mL 2010       Active Problems:  Patient Active Problem List   Diagnosis Code    GERD (gastroesophageal reflux disease) K21.9    HTN (hypertension) I10    Plantar fasciitis M72.2    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

## 2025-07-25 NOTE — CARE COORDINATION
CASE MANAGEMENT DISCHARGE SUMMARY      Discharge to: Home with Home Care    Precertification completed: N/A  Hospital Exemption Notification (HENS) completed:     IMM given: (date) 7/25/25    New Durable Medical Equipment ordered/agency: None ordered    Transportation:    Family/car: private car     Confirmed discharge plan with: Home with [facility] for SN/PT/OT. Patient was given print out on [employer] alert also     Patient: yes     Family:  yes Patient will notify           Facility/Agency, name:[facility] can pull from [facility]  KRISHAN/AVS  does not need to be faxed         RN, name: Hedii    Note: Discharging nurse to complete ECOC, reconcile AVS, and place final copy with patient's discharge packet. RN to ensure that written prescriptions for  Level II medications are sent with patient to the facility as per protocol.  .Yudi Garcia RN

## 2025-07-25 NOTE — PROGRESS NOTES
--------------------------------------------------  C. Data (any 2)    [x] Data Review (any 3)    [x] Consultant/subspecialist notes from yesterday/today    [x] All available current labs reviewed interpreted for clinical significance    [x] Appropriate follow-up labs were ordered  [] Collateral history obtained     [x] Independent Interpretation of tests (any 1)    [x] Telemetry (Rhythm Strip) personally reviewed and interpreted        [] Imaging personally reviewed and interpreted     [x] Discussion (any 1)  [x] Multi-Disciplinary Rounds with Case Management  [] Discussed management of the case with           Labs:  Personally reviewed on 7/25/2025 and interpreted for clinical significance as documented above.     Recent Labs     07/22/25  1734 07/23/25  0421 07/24/25  0424   WBC 11.4* 8.2 6.5   HGB 12.7 11.2* 11.1*   HCT 36.8 32.0* 31.9*    112* 127*     Recent Labs     07/22/25  1734 07/23/25  0421 07/24/25  0424   * 141 141   K 3.6 3.1* 3.6   CL 96* 107 107   CO2 25 24 23   BUN 22* 20 13   CREATININE 1.4* 1.3* 1.0   CALCIUM 9.4 8.1* 8.5   MG  --  1.52* 1.85     Recent Labs     07/22/25  1734   TROPHS 38*     No results for input(s): \"LABA1C\" in the last 72 hours.  Recent Labs     07/22/25  1734   AST 28   ALT 17   BILITOT 0.7   ALKPHOS 83     Recent Labs     07/22/25  1840   LACTA 1.1       Urine Cultures: No results found for: \"LABURIN\"  Blood Cultures: No results found for: \"BC\"  No results found for: \"BLOODCULT2\"  Organism: No results found for: \"ORG\"      Haim Reveles MD